# Patient Record
Sex: FEMALE | Race: BLACK OR AFRICAN AMERICAN | NOT HISPANIC OR LATINO
[De-identification: names, ages, dates, MRNs, and addresses within clinical notes are randomized per-mention and may not be internally consistent; named-entity substitution may affect disease eponyms.]

---

## 2021-04-14 PROBLEM — Z00.00 ENCOUNTER FOR PREVENTIVE HEALTH EXAMINATION: Status: ACTIVE | Noted: 2021-04-14

## 2021-04-22 ENCOUNTER — APPOINTMENT (OUTPATIENT)
Dept: NEPHROLOGY | Facility: CLINIC | Age: 81
End: 2021-04-22
Payer: MEDICARE

## 2021-04-22 VITALS
BODY MASS INDEX: 26.34 KG/M2 | WEIGHT: 160 LBS | DIASTOLIC BLOOD PRESSURE: 72 MMHG | SYSTOLIC BLOOD PRESSURE: 140 MMHG | TEMPERATURE: 97.2 F | HEIGHT: 65.5 IN

## 2021-04-22 DIAGNOSIS — E78.5 HYPERLIPIDEMIA, UNSPECIFIED: ICD-10-CM

## 2021-04-22 DIAGNOSIS — N18.4 CHRONIC KIDNEY DISEASE, STAGE 4 (SEVERE): ICD-10-CM

## 2021-04-22 DIAGNOSIS — Z87.39 PERSONAL HISTORY OF OTHER DISEASES OF THE MUSCULOSKELETAL SYSTEM AND CONNECTIVE TISSUE: ICD-10-CM

## 2021-04-22 DIAGNOSIS — E11.21 TYPE 2 DIABETES MELLITUS WITH DIABETIC NEPHROPATHY: ICD-10-CM

## 2021-04-22 DIAGNOSIS — D63.1 CHRONIC KIDNEY DISEASE, UNSPECIFIED: ICD-10-CM

## 2021-04-22 DIAGNOSIS — E55.9 VITAMIN D DEFICIENCY, UNSPECIFIED: ICD-10-CM

## 2021-04-22 DIAGNOSIS — F19.90 OTHER PSYCHOACTIVE SUBSTANCE USE, UNSPECIFIED, UNCOMPLICATED: ICD-10-CM

## 2021-04-22 DIAGNOSIS — E53.8 DEFICIENCY OF OTHER SPECIFIED B GROUP VITAMINS: ICD-10-CM

## 2021-04-22 DIAGNOSIS — C50.912 PERSONAL HISTORY OF MALIGNANT NEOPLASM OF BREAST: ICD-10-CM

## 2021-04-22 DIAGNOSIS — F17.200 NICOTINE DEPENDENCE, UNSPECIFIED, UNCOMPLICATED: ICD-10-CM

## 2021-04-22 DIAGNOSIS — I63.50 CEREBRAL INFARCTION DUE TO UNSPECIFIED OCCLUSION OR STENOSIS OF UNSPECIFIED CEREBRAL ARTERY: ICD-10-CM

## 2021-04-22 DIAGNOSIS — I10 ESSENTIAL (PRIMARY) HYPERTENSION: ICD-10-CM

## 2021-04-22 DIAGNOSIS — Z85.3 PERSONAL HISTORY OF MALIGNANT NEOPLASM OF BREAST: ICD-10-CM

## 2021-04-22 DIAGNOSIS — Z72.89 OTHER PROBLEMS RELATED TO LIFESTYLE: ICD-10-CM

## 2021-04-22 DIAGNOSIS — M17.10 UNILATERAL PRIMARY OSTEOARTHRITIS, UNSPECIFIED KNEE: ICD-10-CM

## 2021-04-22 DIAGNOSIS — N18.9 CHRONIC KIDNEY DISEASE, UNSPECIFIED: ICD-10-CM

## 2021-04-22 PROCEDURE — 99072 ADDL SUPL MATRL&STAF TM PHE: CPT

## 2021-04-22 PROCEDURE — 99205 OFFICE O/P NEW HI 60 MIN: CPT | Mod: 25

## 2021-04-22 PROCEDURE — 99406 BEHAV CHNG SMOKING 3-10 MIN: CPT

## 2021-04-22 NOTE — PHYSICAL EXAM
[Well Developed] : well developed [Well Nourished] : well nourished [Sclera] : the sclera and conjunctiva were normal [PERRL With Normal Accommodation] : pupils were equal in size, round, and reactive to light [Extraocular Movements] : extraocular movements were intact [Outer Ear] : the ears and nose were normal in appearance [Oropharynx] : the oropharynx was normal [Neck Appearance] : the appearance of the neck was normal [Neck Cervical Mass (___cm)] : no neck mass was observed [Jugular Venous Distention Increased] : there was no jugular-venous distention [Thyroid Diffuse Enlargement] : the thyroid was not enlarged [Thyroid Nodule] : there were no palpable thyroid nodules [Normal Rhythm/Effort] : normal respiratory rhythm and effort [Clear Bilaterally] : the lungs were clear to auscultation bilaterally [Normal to Percussion] : the lungs were normal to percussion [Normal Rate] : normal [Normal S1] : normal S1 [Normal S2] : normal S2 [No Murmur] : no murmurs heard [No Pitting Edema] : no pitting edema present [2+] : left 2+ [No Abnormalities] : the abdominal aorta was not enlarged and no bruit was heard [Edema] : there was no peripheral edema [Nonpitting Edema] : nonpitting edema present [___ +] : bilateral [unfilled]+ pretibial pitting edema [Normal] : normal [Soft, Nontender] : the abdomen was soft and nontender [No Mass] : no masses were palpated [No HSM] : no hepatosplenomegaly noted [Cervical Lymph Nodes Enlarged Posterior Bilaterally] : posterior cervical [Cervical Lymph Nodes Enlarged Anterior Bilaterally] : anterior cervical [Supraclavicular Lymph Nodes Enlarged Bilaterally] : supraclavicular [Axillary Lymph Nodes Enlarged Bilaterally] : axillary [Femoral Lymph Nodes Enlarged Bilaterally] : femoral [Inguinal Lymph Nodes Enlarged Bilaterally] : inguinal [No CVA Tenderness] : no ~M costovertebral angle tenderness [No Spinal Tenderness] : no spinal tenderness [Skin Color & Pigmentation] : normal skin color and pigmentation [Skin Turgor] : normal skin turgor [] : no rash [Deep Tendon Reflexes (DTR)] : deep tendon reflexes were 2+ and symmetric [Sensation] : the sensory exam was normal to light touch and pinprick [No Focal Deficits] : no focal deficits [Oriented To Time, Place, And Person] : oriented to person, place, and time [Impaired Insight] : insight and judgment were intact [Affect] : the affect was normal [S3] : no S3 [S4] : no S4 [Right Carotid Bruit] : no bruit heard over the right carotid [Left Carotid Bruit] : no bruit heard over the left carotid [Right Femoral Bruit] : no bruit heard over the right femoral artery [Left Femoral Bruit] : no bruit heard over the left femoral artery [FreeTextEntry1] : Wheelchair bound

## 2021-04-22 NOTE — ASSESSMENT
[FreeTextEntry1] : Pt present today in office accompanied by her son who is her sole caretaker and whom she resides with. Pt is wheelchair bound and incontinent. Pt denies flank pain, dysuria, hematuria, unable to assess alteration in voiding frequency and volume as pt is incontinent and wears diapers.  Pt reports feeling well, denies any complaints, concerns or other issues. Son reports patient has some vaginal discharge for which PCP/GYN referral was made. Pt is in the process of getting set up with doctors here on [place] now that she is a permanent resident.\par \par CKD IV\par Baseline labs drawn in office today\par SCr 3.25 (3/18/21)\par eGFR 15 (3/18/21)\par Renal US done at [facility] which is not available to review\par \par DM\par Controlled \par HgbA1c 4.9 (3/18/21)\par Not on meds\par \par HTN\par Controlled\par Continue current medication regimen\par \par Anemia\par Stable \par On Folate, iron supplements\par Hgb 10.3 (3/18/21)\par Iron and TSAT within range\par \par HLD\par On statin\par \par Vitamin Deficiency\par On MVI, Vitamin D and Vitamin B12 supplements \par \par Baseline labs obtained in office today\par Unable to collect urine today due to incontinence\par Urine cup sent home with patient - son will return to office\par Medications reconciled\par Torsemide 10mg PO daily for BLE edema (non-pitting) sent Rx today\par Will consider repeat Renal US at next visit if needed\par \par RTO in 1 month

## 2021-04-22 NOTE — REVIEW OF SYSTEMS
[Chills] : chills [Lower Ext Edema] : lower extremity edema [Incontinence] : incontinence [Vaginal Discharge] : vaginal discharge [As Noted in HPI] : as noted in HPI [Arthralgias] : arthralgias [Joint Pain] : joint pain [Joint Stiffness] : joint stiffness [Difficulty Walking] : difficulty walking [Negative] : Heme/Lymph [FreeTextEntry5] : Mild non-pitting edema BLE [FreeTextEntry8] : R [de-identified] : Wheelchair bound

## 2021-04-22 NOTE — HISTORY OF PRESENT ILLNESS
[FreeTextEntry1] : Pt presents to office wheelchair bound, accompanied by her son with whom she resides with and is her caretaker.  Pt is a 80 year old female who recently moved to [place] from [place], [place] to live with her son as she was previously in a SNF in [place] and contracted COVID-19 for which son removed her from and brought her home to live with him. Pt has PMH of CKD IV, YANICK on CKD with temporary inpatient HD, CVA, Anemia, HTN, DM2 (controlled), HLD, Multiple vitamin deficiencies and OA. Pt was diagnosed with CKD in SNF in [place] where she was followed by unknown Nephrologist at that location. Patient presents today for initial consult for regular follow-up and management of her advanced CKD.

## 2021-04-23 ENCOUNTER — NON-APPOINTMENT (OUTPATIENT)
Age: 81
End: 2021-04-23

## 2021-04-23 LAB
25(OH)D3 SERPL-MCNC: 31.7 NG/ML
ALBUMIN SERPL ELPH-MCNC: 3.7 G/DL
ANION GAP SERPL CALC-SCNC: 16 MMOL/L
BASOPHILS # BLD AUTO: 0.02 K/UL
BASOPHILS NFR BLD AUTO: 0.2 %
BUN SERPL-MCNC: 91 MG/DL
CALCIUM SERPL-MCNC: 8.7 MG/DL
CALCIUM SERPL-MCNC: 8.7 MG/DL
CHLORIDE SERPL-SCNC: 116 MMOL/L
CO2 SERPL-SCNC: 10 MMOL/L
CREAT SERPL-MCNC: 3.29 MG/DL
EOSINOPHIL # BLD AUTO: 0.22 K/UL
EOSINOPHIL NFR BLD AUTO: 2.7 %
ESTIMATED AVERAGE GLUCOSE: 97 MG/DL
GLUCOSE SERPL-MCNC: 108 MG/DL
HBA1C MFR BLD HPLC: 5 %
HCT VFR BLD CALC: 30.5 %
HGB BLD-MCNC: 9.3 G/DL
IMM GRANULOCYTES NFR BLD AUTO: 0.4 %
LYMPHOCYTES # BLD AUTO: 2.24 K/UL
LYMPHOCYTES NFR BLD AUTO: 27.2 %
MAN DIFF?: NORMAL
MCHC RBC-ENTMCNC: 27 PG
MCHC RBC-ENTMCNC: 30.5 GM/DL
MCV RBC AUTO: 88.7 FL
MONOCYTES # BLD AUTO: 0.51 K/UL
MONOCYTES NFR BLD AUTO: 6.2 %
NEUTROPHILS # BLD AUTO: 5.23 K/UL
NEUTROPHILS NFR BLD AUTO: 63.3 %
PARATHYROID HORMONE INTACT: 101 PG/ML
PHOSPHATE SERPL-MCNC: 6.9 MG/DL
PLATELET # BLD AUTO: 218 K/UL
POTASSIUM SERPL-SCNC: 4.4 MMOL/L
RBC # BLD: 3.44 M/UL
RBC # FLD: 18.6 %
SODIUM SERPL-SCNC: 142 MMOL/L
WBC # FLD AUTO: 8.25 K/UL

## 2021-04-29 ENCOUNTER — APPOINTMENT (OUTPATIENT)
Dept: NEUROLOGY | Facility: CLINIC | Age: 81
End: 2021-04-29
Payer: MEDICARE

## 2021-04-29 VITALS
OXYGEN SATURATION: 95 % | SYSTOLIC BLOOD PRESSURE: 128 MMHG | WEIGHT: 160 LBS | DIASTOLIC BLOOD PRESSURE: 74 MMHG | HEIGHT: 65.5 IN | TEMPERATURE: 98 F | BODY MASS INDEX: 26.34 KG/M2 | HEART RATE: 69 BPM

## 2021-04-29 DIAGNOSIS — Z86.69 PERSONAL HISTORY OF OTHER DISEASES OF THE NERVOUS SYSTEM AND SENSE ORGANS: ICD-10-CM

## 2021-04-29 DIAGNOSIS — Z86.73 PERSONAL HISTORY OF TRANSIENT ISCHEMIC ATTACK (TIA), AND CEREBRAL INFARCTION W/OUT RESIDUAL DEFICITS: ICD-10-CM

## 2021-04-29 DIAGNOSIS — Z86.79 PERSONAL HISTORY OF OTHER DISEASES OF THE CIRCULATORY SYSTEM: ICD-10-CM

## 2021-04-29 DIAGNOSIS — R41.82 ALTERED MENTAL STATUS, UNSPECIFIED: ICD-10-CM

## 2021-04-29 PROCEDURE — 99205 OFFICE O/P NEW HI 60 MIN: CPT

## 2021-04-29 PROCEDURE — 99072 ADDL SUPL MATRL&STAF TM PHE: CPT

## 2021-04-29 RX ORDER — LINACLOTIDE 145 UG/1
145 CAPSULE, GELATIN COATED ORAL DAILY
Refills: 0 | Status: ACTIVE | COMMUNITY
Start: 2021-04-22

## 2021-04-29 RX ORDER — AMLODIPINE BESYLATE 5 MG/1
5 TABLET ORAL
Qty: 90 | Refills: 0 | Status: ACTIVE | COMMUNITY
Start: 2021-04-22

## 2021-04-29 RX ORDER — PANTOPRAZOLE 40 MG/1
40 TABLET, DELAYED RELEASE ORAL AS DIRECTED
Refills: 0 | Status: ACTIVE | COMMUNITY
Start: 2021-04-22

## 2021-04-29 RX ORDER — FOLIC ACID/VIT B COMPLEX AND C 0.8 MG
TABLET ORAL
Qty: 30 | Refills: 1 | Status: ACTIVE | COMMUNITY
Start: 2021-04-22

## 2021-04-29 RX ORDER — CHLORHEXIDINE GLUCONATE 4 %
325 (65 FE) LIQUID (ML) TOPICAL 3 TIMES DAILY
Qty: 270 | Refills: 1 | Status: ACTIVE | COMMUNITY
Start: 2021-04-22

## 2021-04-29 RX ORDER — ATORVASTATIN CALCIUM 20 MG/1
20 TABLET, FILM COATED ORAL
Qty: 90 | Refills: 1 | Status: ACTIVE | COMMUNITY
Start: 2021-04-22

## 2021-04-29 RX ORDER — KRILL/OM-3/DHA/EPA/PHOSPHO/AST 1000-230MG
81 CAPSULE ORAL AT BEDTIME
Refills: 0 | Status: ACTIVE | COMMUNITY
Start: 2021-04-22

## 2021-04-29 RX ORDER — FOLIC ACID 1 MG/1
1 TABLET ORAL
Refills: 1 | Status: ACTIVE | COMMUNITY
Start: 2021-04-22

## 2021-04-29 RX ORDER — DULOXETINE HYDROCHLORIDE 30 MG/1
30 CAPSULE, DELAYED RELEASE PELLETS ORAL DAILY
Refills: 0 | Status: ACTIVE | COMMUNITY
Start: 2021-04-22

## 2021-04-29 RX ORDER — MULTIVIT-MIN/FOLIC/VIT K/LYCOP 400-300MCG
25 MCG TABLET ORAL DAILY
Refills: 0 | Status: ACTIVE | COMMUNITY
Start: 2021-04-22

## 2021-04-29 RX ORDER — METOPROLOL SUCCINATE 25 MG/1
25 TABLET, EXTENDED RELEASE ORAL
Qty: 90 | Refills: 1 | Status: ACTIVE | COMMUNITY
Start: 2021-04-22

## 2021-04-29 NOTE — PHYSICAL EXAM
[General Appearance - Alert] : alert [General Appearance - In No Acute Distress] : in no acute distress [Sclera] : the sclera and conjunctiva were normal [PERRL With Normal Accommodation] : pupils were equal in size, round, reactive to light, with normal accommodation [Outer Ear] : the ears and nose were normal in appearance [Neck Appearance] : the appearance of the neck was normal [Skin Color & Pigmentation] : normal skin color and pigmentation [FreeTextEntry1] : Mental Status: AAO x3, no dysarthria, no aphasia, communicating appropriately\par CN: PERRL, EOMI, VFF, V1-V3 sensation intact, hearing grossly intact, right facial droop, tongue midline\par Motor: \par RUE 3/5\par LLE 4/5\par RLE 4/5\par LUE 5/5\par Bilateral lower extremities edematous\par Reflexes: 1+ throughout, toes equivocal bilaterally\par Coordination: no dysmetria on FTN on left, unable to perform on right due to weakness\par Gait: wheelchair bound\par \par

## 2021-04-29 NOTE — ASSESSMENT
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 She has no known seizure history, however, reason Keppra was started during her hospital admission is unclear.\par \par Recommend continue Keppra 500mg daily for now (this is the dose pt was discharged on )\par 24 hour ambulatory EEG\par Will have staff obtain all records from [facility]. Will decide upon tapering off of Keppra after reviewing records and 24 hr ambulatory EEG.\par \par Can consider EMG/NCS for lower extremity in the future, however, would wait until swelling improves as study will be more yielding without edema\par \par She was advised to notify me for worsening symptoms.\par \par I will see her back after above workup or sooner if necessary

## 2021-04-29 NOTE — HISTORY OF PRESENT ILLNESS
[FreeTextEntry1] : 81 yo woman with medical conditions as outlined below presents with her son for further evaluation. 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 She states she was smoking at the time, but has since quit. She is accompanied to the visit by her son who aided in giving history. He states in August of 2020, she was admitted to [facility] due to renal failure. She had temporary dialysis and required intubation due to altered mental status and decreased responsiveness at that time. She was found to have cellulitis in both legs. He states she was walking with a walker two years ago, but she had slow progression of weakness and is now wheelchair bound. He states pt had blood clots in her legs, but she was taken off of eliquis by her doctors after being on it for sometime. He denies her having memory problems. Patient reports having burning and pain in her feet for years. \par \par Patient's son would like her to come off of Keppra if possible. He states that she was started on it during her hospitalization in [place] in August 2020, but for unclear reason. He states they saw something abnormal on her EEG at that time. Patient's son has a history of convulsive seizures when he was younger, but she denies a personal history of epilepsy. She denies learning difficulties growing up or prior head trauma. She has no further complaints.

## 2021-05-12 ENCOUNTER — APPOINTMENT (OUTPATIENT)
Dept: NEUROLOGY | Facility: CLINIC | Age: 81
End: 2021-05-12
Payer: MEDICARE

## 2021-05-12 PROCEDURE — 95816 EEG AWAKE AND DROWSY: CPT

## 2021-05-12 PROCEDURE — 93040 RHYTHM ECG WITH REPORT: CPT

## 2021-05-13 PROCEDURE — 95719 EEG PHYS/QHP EA INCR W/O VID: CPT

## 2021-05-13 PROCEDURE — 99072 ADDL SUPL MATRL&STAF TM PHE: CPT

## 2021-05-13 PROCEDURE — 95700 EEG CONT REC W/VID EEG TECH: CPT

## 2021-05-13 PROCEDURE — 95708 EEG WO VID EA 12-26HR UNMNTR: CPT

## 2021-05-14 ENCOUNTER — NON-APPOINTMENT (OUTPATIENT)
Age: 81
End: 2021-05-14

## 2021-05-17 ENCOUNTER — NON-APPOINTMENT (OUTPATIENT)
Age: 81
End: 2021-05-17

## 2021-05-17 LAB
APPEARANCE: CLEAR
BACTERIA: NEGATIVE
BILIRUBIN URINE: NEGATIVE
BLOOD URINE: NORMAL
COLOR: NORMAL
CREAT SPEC-SCNC: 34 MG/DL
CREAT/PROT UR: 2 RATIO
GLUCOSE QUALITATIVE U: NEGATIVE
HYALINE CASTS: 3 /LPF
KETONES URINE: NEGATIVE
LEUKOCYTE ESTERASE URINE: ABNORMAL
MICROSCOPIC-UA: NORMAL
NITRITE URINE: NEGATIVE
PH URINE: 7
PROT UR-MCNC: 70 MG/DL
PROTEIN URINE: ABNORMAL
RED BLOOD CELLS URINE: 4 /HPF
SPECIFIC GRAVITY URINE: 1.01
SQUAMOUS EPITHELIAL CELLS: 3 /HPF
URINE COMMENTS: NORMAL
UROBILINOGEN URINE: NORMAL
WHITE BLOOD CELLS URINE: 103 /HPF

## 2021-05-19 ENCOUNTER — APPOINTMENT (OUTPATIENT)
Dept: NEUROLOGY | Facility: CLINIC | Age: 81
End: 2021-05-19
Payer: MEDICARE

## 2021-05-19 ENCOUNTER — RX RENEWAL (OUTPATIENT)
Age: 81
End: 2021-05-19

## 2021-05-19 VITALS
TEMPERATURE: 97.5 F | SYSTOLIC BLOOD PRESSURE: 118 MMHG | OXYGEN SATURATION: 99 % | HEIGHT: 65.5 IN | BODY MASS INDEX: 26.34 KG/M2 | DIASTOLIC BLOOD PRESSURE: 60 MMHG | HEART RATE: 66 BPM | WEIGHT: 160 LBS

## 2021-05-19 PROCEDURE — 99214 OFFICE O/P EST MOD 30 MIN: CPT

## 2021-05-31 NOTE — HISTORY OF PRESENT ILLNESS
[FreeTextEntry1] : Since last visit, patient has had no further seizures. I have reviewed her records from [facility] and as per chart notes, there was no clear reason for starting Keppra. EEG showed triphasic waves which can be seen in metabolic encephalopathy. Patient's son, stopped patient's Keppra 2 weeks ago and she has had no episodes of confusion. She had a 24 hour ambulatory EEG which was found to be normal.

## 2021-05-31 NOTE — ASSESSMENT
[FreeTextEntry1] : 79 yo woman with hx of stroke admitted to [facility] for encephalopathy in the setting of infection now improved\par \par As 24 hr ambulatory EEG unremarkable, it is reasonable to remain off Keppra, however, son and patient were made aware that patient may have a seizure in the future given hx of stroke. They demonstrated understanding\par \par Will try to obtain open MRI brain w/o contrast \par \par F/u with me after MRI\par \par Son and patient were advised to notify me for worsening symptoms.

## 2021-05-31 NOTE — PHYSICAL EXAM
[General Appearance - Alert] : alert [Sclera] : the sclera and conjunctiva were normal [Outer Ear] : the ears and nose were normal in appearance [PERRL With Normal Accommodation] : pupils were equal in size, round, reactive to light, with normal accommodation [Neck Appearance] : the appearance of the neck was normal [Abnormal Walk] : normal gait [Skin Color & Pigmentation] : normal skin color and pigmentation [FreeTextEntry1] : Mental Status: AAO x3, no dysarthria, no aphasia, communicating appropriately\par CN: PERRL, EOMI, VFF, V1-V3 sensation intact, hearing grossly intact, right facial droop, tongue midline\par Motor: \par RUE 3/5\par LLE 4/5\par RLE 4/5\par LUE 5/5\par Bilateral lower extremities edematous\par Reflexes: 1+ throughout, toes equivocal bilaterally\par Coordination: no dysmetria on FTN on left, unable to perform on right due to weakness\par Gait: wheelchair bound

## 2021-06-16 ENCOUNTER — RX RENEWAL (OUTPATIENT)
Age: 81
End: 2021-06-16

## 2021-06-16 RX ORDER — TORSEMIDE 10 MG/1
10 TABLET ORAL
Qty: 30 | Refills: 0 | Status: ACTIVE | COMMUNITY
Start: 2021-04-22 | End: 1900-01-01

## 2021-07-21 ENCOUNTER — RX RENEWAL (OUTPATIENT)
Age: 81
End: 2021-07-21

## 2023-05-14 ENCOUNTER — INPATIENT (INPATIENT)
Facility: HOSPITAL | Age: 83
LOS: 8 days | Discharge: HOME HEALTH | End: 2023-05-23
Attending: HOSPITALIST | Admitting: HOSPITALIST
Payer: MEDICARE

## 2023-05-14 VITALS
TEMPERATURE: 98 F | OXYGEN SATURATION: 99 % | DIASTOLIC BLOOD PRESSURE: 80 MMHG | RESPIRATION RATE: 19 BRPM | SYSTOLIC BLOOD PRESSURE: 133 MMHG | WEIGHT: 160.06 LBS | HEART RATE: 41 BPM

## 2023-05-14 LAB
ALBUMIN SERPL ELPH-MCNC: 2.9 G/DL — LOW (ref 3.3–5)
ALP SERPL-CCNC: 73 U/L — SIGNIFICANT CHANGE UP (ref 40–120)
ALT FLD-CCNC: 14 U/L — SIGNIFICANT CHANGE UP (ref 12–78)
ANION GAP SERPL CALC-SCNC: 13 MMOL/L — SIGNIFICANT CHANGE UP (ref 5–17)
AST SERPL-CCNC: 12 U/L — LOW (ref 15–37)
BASOPHILS # BLD AUTO: 0.02 K/UL — SIGNIFICANT CHANGE UP (ref 0–0.2)
BASOPHILS NFR BLD AUTO: 0.2 % — SIGNIFICANT CHANGE UP (ref 0–2)
BILIRUB SERPL-MCNC: 0.3 MG/DL — SIGNIFICANT CHANGE UP (ref 0.2–1.2)
BLD GP AB SCN SERPL QL: SIGNIFICANT CHANGE UP
BUN SERPL-MCNC: 109 MG/DL — HIGH (ref 7–23)
CALCIUM SERPL-MCNC: 8.2 MG/DL — LOW (ref 8.5–10.1)
CHLORIDE SERPL-SCNC: 104 MMOL/L — SIGNIFICANT CHANGE UP (ref 96–108)
CO2 SERPL-SCNC: 15 MMOL/L — LOW (ref 22–31)
CREAT SERPL-MCNC: 8.93 MG/DL — HIGH (ref 0.5–1.3)
EGFR: 4 ML/MIN/1.73M2 — LOW
EOSINOPHIL # BLD AUTO: 0.11 K/UL — SIGNIFICANT CHANGE UP (ref 0–0.5)
EOSINOPHIL NFR BLD AUTO: 0.9 % — SIGNIFICANT CHANGE UP (ref 0–6)
GAS PNL BLDV: SIGNIFICANT CHANGE UP
GLUCOSE SERPL-MCNC: 96 MG/DL — SIGNIFICANT CHANGE UP (ref 70–99)
HCT VFR BLD CALC: 20.3 % — CRITICAL LOW (ref 34.5–45)
HGB BLD-MCNC: 6.2 G/DL — CRITICAL LOW (ref 11.5–15.5)
IMM GRANULOCYTES NFR BLD AUTO: 0.6 % — SIGNIFICANT CHANGE UP (ref 0–0.9)
LIDOCAIN IGE QN: 110 U/L — SIGNIFICANT CHANGE UP (ref 73–393)
LYMPHOCYTES # BLD AUTO: 2.77 K/UL — SIGNIFICANT CHANGE UP (ref 1–3.3)
LYMPHOCYTES # BLD AUTO: 23.5 % — SIGNIFICANT CHANGE UP (ref 13–44)
MAGNESIUM SERPL-MCNC: 2.6 MG/DL — SIGNIFICANT CHANGE UP (ref 1.6–2.6)
MCHC RBC-ENTMCNC: 24.9 PG — LOW (ref 27–34)
MCHC RBC-ENTMCNC: 30.5 G/DL — LOW (ref 32–36)
MCV RBC AUTO: 81.5 FL — SIGNIFICANT CHANGE UP (ref 80–100)
MONOCYTES # BLD AUTO: 0.49 K/UL — SIGNIFICANT CHANGE UP (ref 0–0.9)
MONOCYTES NFR BLD AUTO: 4.2 % — SIGNIFICANT CHANGE UP (ref 2–14)
NEUTROPHILS # BLD AUTO: 8.32 K/UL — HIGH (ref 1.8–7.4)
NEUTROPHILS NFR BLD AUTO: 70.6 % — SIGNIFICANT CHANGE UP (ref 43–77)
NRBC # BLD: 0 /100 WBCS — SIGNIFICANT CHANGE UP (ref 0–0)
NT-PROBNP SERPL-SCNC: HIGH PG/ML (ref 0–450)
PLATELET # BLD AUTO: 307 K/UL — SIGNIFICANT CHANGE UP (ref 150–400)
POTASSIUM SERPL-MCNC: 4.9 MMOL/L — SIGNIFICANT CHANGE UP (ref 3.5–5.3)
POTASSIUM SERPL-SCNC: 4.9 MMOL/L — SIGNIFICANT CHANGE UP (ref 3.5–5.3)
PROT SERPL-MCNC: 6.9 GM/DL — SIGNIFICANT CHANGE UP (ref 6–8.3)
RAPID RVP RESULT: SIGNIFICANT CHANGE UP
RBC # BLD: 2.49 M/UL — LOW (ref 3.8–5.2)
RBC # FLD: 23.3 % — HIGH (ref 10.3–14.5)
SARS-COV-2 RNA SPEC QL NAA+PROBE: SIGNIFICANT CHANGE UP
SODIUM SERPL-SCNC: 132 MMOL/L — LOW (ref 135–145)
TROPONIN I, HIGH SENSITIVITY RESULT: 40.5 NG/L — SIGNIFICANT CHANGE UP
WBC # BLD: 11.78 K/UL — HIGH (ref 3.8–10.5)
WBC # FLD AUTO: 11.78 K/UL — HIGH (ref 3.8–10.5)

## 2023-05-14 PROCEDURE — 74176 CT ABD & PELVIS W/O CONTRAST: CPT | Mod: 26

## 2023-05-14 PROCEDURE — 93010 ELECTROCARDIOGRAM REPORT: CPT

## 2023-05-14 PROCEDURE — 99285 EMERGENCY DEPT VISIT HI MDM: CPT

## 2023-05-14 PROCEDURE — 71045 X-RAY EXAM CHEST 1 VIEW: CPT | Mod: 26

## 2023-05-14 RX ORDER — SODIUM BICARBONATE 1 MEQ/ML
650 SYRINGE (ML) INTRAVENOUS THREE TIMES A DAY
Refills: 0 | Status: DISCONTINUED | OUTPATIENT
Start: 2023-05-14 | End: 2023-05-15

## 2023-05-14 RX ORDER — PANTOPRAZOLE SODIUM 20 MG/1
40 TABLET, DELAYED RELEASE ORAL ONCE
Refills: 0 | Status: COMPLETED | OUTPATIENT
Start: 2023-05-14 | End: 2023-05-14

## 2023-05-14 RX ORDER — CHLORHEXIDINE GLUCONATE 213 G/1000ML
1 SOLUTION TOPICAL
Refills: 0 | Status: DISCONTINUED | OUTPATIENT
Start: 2023-05-14 | End: 2023-05-23

## 2023-05-14 RX ORDER — FUROSEMIDE 40 MG
80 TABLET ORAL ONCE
Refills: 0 | Status: COMPLETED | OUTPATIENT
Start: 2023-05-14 | End: 2023-05-14

## 2023-05-14 RX ORDER — PANTOPRAZOLE SODIUM 20 MG/1
40 TABLET, DELAYED RELEASE ORAL
Refills: 0 | Status: DISCONTINUED | OUTPATIENT
Start: 2023-05-14 | End: 2023-05-15

## 2023-05-14 RX ORDER — SODIUM CHLORIDE 9 MG/ML
500 INJECTION, SOLUTION INTRAVENOUS ONCE
Refills: 0 | Status: COMPLETED | OUTPATIENT
Start: 2023-05-14 | End: 2023-05-14

## 2023-05-14 RX ORDER — CALCIUM GLUCONATE 100 MG/ML
2 VIAL (ML) INTRAVENOUS ONCE
Refills: 0 | Status: COMPLETED | OUTPATIENT
Start: 2023-05-14 | End: 2023-05-14

## 2023-05-14 RX ORDER — CALCIUM ACETATE 667 MG
667 TABLET ORAL
Refills: 0 | Status: DISCONTINUED | OUTPATIENT
Start: 2023-05-14 | End: 2023-05-23

## 2023-05-14 RX ADMIN — PANTOPRAZOLE SODIUM 40 MILLIGRAM(S): 20 TABLET, DELAYED RELEASE ORAL at 16:35

## 2023-05-14 RX ADMIN — CHLORHEXIDINE GLUCONATE 1 APPLICATION(S): 213 SOLUTION TOPICAL at 22:14

## 2023-05-14 RX ADMIN — Medication 200 GRAM(S): at 16:35

## 2023-05-14 RX ADMIN — SODIUM CHLORIDE 500 MILLILITER(S): 9 INJECTION, SOLUTION INTRAVENOUS at 17:54

## 2023-05-14 RX ADMIN — PANTOPRAZOLE SODIUM 40 MILLIGRAM(S): 20 TABLET, DELAYED RELEASE ORAL at 21:58

## 2023-05-14 RX ADMIN — SODIUM CHLORIDE 500 MILLILITER(S): 9 INJECTION, SOLUTION INTRAVENOUS at 17:24

## 2023-05-14 RX ADMIN — Medication 80 MILLIGRAM(S): at 21:58

## 2023-05-14 NOTE — ED ADULT TRIAGE NOTE - CHIEF COMPLAINT QUOTE
patient  BIBA c/o of SOB , chest pain Hg level 6 , patient has a residual weakens R side and slurred speech from a previous stroke , as per patient this is her base line

## 2023-05-14 NOTE — ED PROVIDER NOTE - OBJECTIVE STATEMENT
82-year-old female with a past medical history of CKD not on dialysis, breast cancer status postresection, history of CVA with right-sided deficits presents to the ED with shortness of breath over the last 3 days.  She denies any orthopnea or any lower extremity swelling.  No fevers or chills.  No cough congestion or upper respiratory symptoms.  She is nonambulatory at baseline coming from nursing facility.  Patient was noted to be bradycardic by EMS and brought back to room 36 for immediate evaluation.

## 2023-05-14 NOTE — H&P ADULT - NSHPPHYSICALEXAM_GEN_ALL_CORE
GENERAL: NAD, lying in bed comfortably  HEAD:  Atraumatic, normocephalic  EYES: EOMI, PERRL, conjunctiva and sclera clear  NECK: Supple, trachea midline, no JVD  HEART: Regular rate and rhythm  LUNGS: Unlabored respirations.  Clear to auscultation bilaterally, no crackles, wheezing, or rhonchi  ABDOMEN: mild LUQ/RUQ abd pain, nondistended no rebound tenderness or guarding   EXTREMITIES: 2+ peripheral pulses bilaterally. 1+ edema bilat   NERVOUS SYSTEM:  A&Ox3, following commands, residual right sided weakness

## 2023-05-14 NOTE — ED PROVIDER NOTE - CLINICAL SUMMARY MEDICAL DECISION MAKING FREE TEXT BOX
82-year-old female with a past medical history of CKD not on dialysis, breast cancer status postresection, history of CVA with right-sided deficits presents to the ED with shortness of breath over the last 3 days. According to son at bedside, was noted to be anemic with a hemoglobin less than 7 on outpatient labs a few days ago.  Also noted by EMS to be taking diltiazem.  Patient endorsing shortness of breath at bedside without any respiratory distress.  Not tachypneic.  Initial EKG as noted below:    40 bpm, bradycardic.  PA interval not seen.   right bundle branch block morphology.  QTc 436 ms within normal limits.  EKG is consistent with a junctional rhythm with a heart rate in the 40s without any acute ST segment changes.  Patient has a right bundle branch block morphology with a QRS complex.    Differential diagnosis includes but not limited to anemia versus hypovolemia versus calcium channel effects.  Will evaluate for ACS.  Will order labs, EKG, meds, imaging, and reassess.  Calcium given in the context of diltiazem use.

## 2023-05-14 NOTE — H&P ADULT - NSHPLABSRESULTS_GEN_ALL_CORE
LABS:                        6.2    11.78 )-----------( 307      ( 14 May 2023 16:35 )             20.3     05-14    132<L>  |  104  |  109<H>  ----------------------------<  96  4.9   |  15<L>  |  8.93<H>    Ca    8.2<L>      14 May 2023 16:35  Phos  8.0     05-14  Mg     2.6     05-14    TPro  6.9  /  Alb  2.9<L>  /  TBili  0.3  /  DBili  x   /  AST  12<L>  /  ALT  14  /  AlkPhos  73  05-14

## 2023-05-14 NOTE — H&P ADULT - NS ATTEND AMEND GEN_ALL_CORE FT
Pt is an 81 yo BF with h/o CKD stage 4 (has had HD) and CVA presented  to the ER 2 to anemia and abd pain.  Pt found to have a Hb 6.2 and was bradycardic but hemodynamically stable. ICU dx: 1) junctional rhythm (bradycardia) 2) Severe anemia, s/p 1 unit PRBC    CVS: Pt no longer sign bradycardic  HEME: Severe anemia in part 2 to CKD; s/p 1 unit PRBC/ ? of giving pt Epogen  FEN: Po renal diet  Renal/Social: Pt states she does not want HD and understands she will eventually die with no HD/ F/u as per Renal  Possible transfer to F

## 2023-05-14 NOTE — PATIENT PROFILE ADULT - FALL HARM RISK - HARM RISK INTERVENTIONS

## 2023-05-14 NOTE — PROVIDER CONTACT NOTE (EICU) - SITUATION
I was called by ICU PA about this  pt currently in ER at 18:55 - in need for ICU care.  Pt is a 82 y F with Hx CKD not on HD ( cr 8.4), CAD on BB, breast ca, CVA with right side deficits who came in with 3 days of SOB and abd pain. In ER  noted bradycardia 40 bpm with junctional rhythm.  She was found to have progressive anemia  H/H 6.2/20.3 today for Hgb 6.7 and 8.0 in last few weeks. No obvious evidence of bleeding

## 2023-05-14 NOTE — ED PROVIDER NOTE - CARE PLAN
1 Principal Discharge DX:	Hypervolemia  Secondary Diagnosis:	Junctional bradycardia  Secondary Diagnosis:	Acute on chronic kidney failure

## 2023-05-14 NOTE — ED PROVIDER NOTE - ATTENDING CONTRIBUTION TO CARE
I have personally seen and examined this pt I have fully participated in the care of this pt I have made amendments to the documentation where appropriate and otherwise hx, exam and plan as documented by Dr. Pizarro. Son is at bed side sts pt has a hx of kidney failure had blood drawn 3 days ago was told pt had hemoglobin 6. Pt has a hx of cva with right side weakness pt now has junctional bradicardia most likely due to elevated potassium calcium gluconate 2 gram ivpb is ordered.

## 2023-05-14 NOTE — ED ADULT TRIAGE NOTE - HISTORY OF COVID-19 VACCINATION
Chief Complaint   Patient presents with    Well Child     9mo       1. Have you been to the ER, urgent care clinic since your last visit? Hospitalized since your last visit? Yes When: 4/24/22 [facility] ER for bronchiolitis, given asthma pump     2. Have you seen or consulted any other health care providers outside of the [address] [place] [address] since your last visit? Include any pap smears or colon screening. No    Pt is here with mother today for [facility] ER. Mom is concerned with rash on lower back.      Visit Vitals  Temp 97.5 °F (36.4 °C) (Temporal)   Resp 22   Ht (!) 2' 3.56\" (0.7 m)   Wt 17 lb 12.3 oz (8.06 kg)   BMI 16.45 kg/m² Yes

## 2023-05-14 NOTE — H&P ADULT - ASSESSMENT
81yo F w/hx CKD stage 4 (Crn 8.42), CVA presents to the ED with anemia and abd pain and found to be bradycardic but HD stable with junctional rhythm likely 2/2 medication induced with progressive kidney disease and anemia likely of chronic disease. Admitted to ICU for HD monitoring.     # Neuro:  -A/Ox3  no active issues    #Resp:  -satting adequately on RA, maintain sats>92%   -cxr wnl  - incentive spirometry   -RVP neg     #CV:  // junctional selene  -likely 2/2 medication induced with progressive CKD  -hold all AV veronica blocking agents  -trops neg and no ischemic ekg changes. daily EKG and cards consult in AM  -zolls at bedside  -HD stable without vasopressor requirements  - MAP goal>65  -will diurese with prbc infusion     #GI:  //abd pain 2/2 uremia vs intrabd pathology?  -no evidence of GI bleed  -Diet: advance as tolerated  -f/u CT abd non con   - Bowel regimen    #Renal:  // acute on chronic CKD stage 4 with non AG  metabolic acidosis   -Crn 8.42-->8.93 but makes urine  - on diuretics at home. clinically hypervolemic. will diurese with prbc   - krys, cont to monitor strict I/Os  - metabolic acidosis likely 2/2 ARF  -renal consulted in ED, will f/u recs     #ID:  - afebrile, wbc nl  - cont to monitor off abx     #Heme:  //anemia likely of chronic disease from CKD   - Hb 6.7-->6.2  -transfuse for Hb target >7 and f/u repeat cbc   -HSQ once cbc stable     #Endo:  - maintain goal glucose<180 on bmp    case and plan discussed with eICU attending

## 2023-05-14 NOTE — ED ADULT NURSE REASSESSMENT NOTE - NS ED NURSE REASSESS COMMENT FT1
patient bradycardic, dr centeno made aware, placed on pacing pads. blood consent signed with son for blood. patient agrees at this time.

## 2023-05-14 NOTE — PROGRESS NOTE ADULT - SUBJECTIVE AND OBJECTIVE BOX
Patient is a 82y old  Female who presents with a chief complaint of bradycardia and anemia (14 May 2023 19:16)      BRIEF HOSPITAL COURSE:   2-year-old F w/PMH CKD stage 4 (baseline Crn 8.26)  not on dialysis (but has had urgent HD in the past), breast cancer status postresection, history of CVA with right-sided deficits had lab work done at outpt office with Hb of 6.7 and was told to go to ED. Pt also endorses dry cough abd pain for last few days.  She denies any orthopnea or any lower extremity swelling.  No fevers or chills.  She is nonambulatory at baseline but still urinates. Notably, patient is on a metoprolol at home. In the ED, pt found to be bradycardic to 30s with junctional rhythm on EKG. Pt A/Ox3 and asx with bradycardia and BP in 140s. Trops negative and EKG without ischemic changes. CBC with Hb of 6.2. Bicarb 15 and BUN/Crn 109/8.93.   ICU consulted for bradycardia.     Admitted for such       Events last 24 hours:   wrote for 80mg lasix to be received after blood transfusion 2/2 SOB, effusions seen on CT      PAST MEDICAL & SURGICAL HISTORY:  Stage 5 chronic kidney disease not on chronic dialysis      Anemia      No significant past surgical history        Allergies    No Known Allergies    Intolerances      FAMILY HISTORY:  No pertinent family history in first degree relatives    social hx:  Denies    Review of Systems:  CONSTITUTIONAL: No fever, chills, or fatigue  EYES: No eye pain, visual disturbances, or discharge  ENMT:  No difficulty hearing, tinnitus, vertigo; No sinus or throat pain  NECK: No pain or stiffness  RESPIRATORY: No cough, wheezing, chills or hemoptysis; No shortness of breath  CARDIOVASCULAR: No chest pain, palpitations, dizziness, or leg swelling  GASTROINTESTINAL: No abdominal or epigastric pain. No nausea, vomiting, or hematemesis; No diarrhea or constipation. No melena or hematochezia.  GENITOURINARY: No dysuria, frequency, hematuria, or incontinence  NEUROLOGICAL: No headaches, memory loss, loss of strength, numbness, or tremors  SKIN: No itching, burning, rashes, or lesions   MUSCULOSKELETAL: No joint pain or swelling; No muscle, back, or extremity pain  PSYCHIATRIC: No depression, anxiety, mood swings, or difficulty sleeping      Medications:    furosemide   Injectable 80 milliGRAM(s) IV Push once                      chlorhexidine 2% Cloths 1 Application(s) Topical <User Schedule>            ICU Vital Signs Last 24 Hrs  T(C): 36.6 (14 May 2023 18:20), Max: 36.7 (14 May 2023 15:39)  T(F): 97.8 (14 May 2023 18:20), Max: 98 (14 May 2023 15:39)  HR: 37 (14 May 2023 18:20) (36 - 41)  BP: 129/56 (14 May 2023 18:40) (129/56 - 143/80)  BP(mean): --  ABP: --  ABP(mean): --  RR: 22 (14 May 2023 18:20) (16 - 22)  SpO2: 96% (14 May 2023 18:40) (96% - 99%)    O2 Parameters below as of 14 May 2023 18:40  Patient On (Oxygen Delivery Method): room air          Vital Signs Last 24 Hrs  T(C): 36.6 (14 May 2023 18:20), Max: 36.7 (14 May 2023 15:39)  T(F): 97.8 (14 May 2023 18:20), Max: 98 (14 May 2023 15:39)  HR: 37 (14 May 2023 18:20) (36 - 41)  BP: 129/56 (14 May 2023 18:40) (129/56 - 143/80)  BP(mean): --  RR: 22 (14 May 2023 18:20) (16 - 22)  SpO2: 96% (14 May 2023 18:40) (96% - 99%)    Parameters below as of 14 May 2023 18:40  Patient On (Oxygen Delivery Method): room air            I&O's Detail        LABS:                        6.2    11.78 )-----------( 307      ( 14 May 2023 16:35 )             20.3     05-14    132<L>  |  104  |  109<H>  ----------------------------<  96  4.9   |  15<L>  |  8.93<H>    Ca    8.2<L>      14 May 2023 16:35  Phos  8.0     05-14  Mg     2.6     05-14    TPro  6.9  /  Alb  2.9<L>  /  TBili  0.3  /  DBili  x   /  AST  12<L>  /  ALT  14  /  AlkPhos  73  05-14          CAPILLARY BLOOD GLUCOSE            CULTURES:      Physical Examination:    General: Elderly female in bed, NAD    HEENT: Pupils equal, reactive to light.  Symmetric.    PULM: decreased breath sounds at both lung bases    CVS: +s1/s2    ABD: Soft    EXT: Mild LE edema     SKIN: Warm    Neuro: awake and alert     RADIOLOGY:   < from: CT Abdomen and Pelvis No Cont (05.14.23 @ 19:57) >    INTERPRETATION:  CLINICAL INFORMATION: Abdominal pain, end-stage renal   disease, not on dialysis, history of cystic kidney disease, breast   cancer, history of stroke    COMPARISON: None.    CONTRAST/COMPLICATIONS:  IV Contrast: None  Oral Contrast: None  Complications: None reported    PROCEDURE:  CT of the Abdomen and Pelvis was performed.  Sagittal and coronal reformats were performed.    FINDINGS:  LOWER CHEST: Small bilateral effusions, mitral valve calcifications and   coronary artery calcifications. Incidentally noted lipoma in the left rib   cage musculature    LIVER: Within normal limits.  BILE DUCTS: Normal caliber.  GALLBLADDER: Cholecystectomy.  SPLEEN: Within normal limits.  PANCREAS: Atrophic  ADRENALS: Within normal limits.  KIDNEYS/URETERS: Left kidney is atrophic and contains a calcified cyst.   Right kidney is also atrophic and contains a simple appearing cyst.    BLADDER: Within normal limits.  REPRODUCTIVE ORGANS: Calcified fibroid uterus    BOWEL: No bowel obstruction is seen. A moderate amount of stool is seen   in the rectal vault. Oral contrast is seen retained in numerous colonic   diverticula without evidence of diverticulitis. Appendix is not   visualized. No evidence of inflammation in the pericecal region.  PERITONEUM: No ascites.  VESSELS: IVC filter seen in place. Atherosclerotic calcifications of the   aorta  RETROPERITONEUM/LYMPH NODES: No lymphadenopathy.  ABDOMINAL WALL: Mild edema in the subcutaneous soft tissues  BONES: Within normal limits.    IMPRESSION:  Moderate amount of stool in the rectal vault. No evidence of   inflammation. Small bilateral effusions. Other findings as above.

## 2023-05-14 NOTE — CONSULT NOTE ADULT - SUBJECTIVE AND OBJECTIVE BOX
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 C/o interm cough, + SOB. No fevers or chills. No changes in UO. In the ED pt found to be bradycardic to 30s. Noted to have Hgb 6.2, K 4.9.    ROS:  as above    Allergies:  No Known Allergies    PAST MEDICAL & SURGICAL HISTORY: as above  CKD 5  Anemia    SOCIAL HISTORY:  negative    FAMILY HISTORY:  No pertinent family history in first degree relatives    MEDICATIONS  (STANDING):  chlorhexidine 2% Cloths 1 Application(s) Topical <User Schedule>  pantoprazole  Injectable 40 milliGRAM(s) IV Push two times a day    Vital Signs Last 24 Hrs  T(C): 36.5 (05-14-23 @ 20:30), Max: 36.7 (05-14-23 @ 15:39)  T(F): 97.7 (05-14-23 @ 20:30), Max: 98 (05-14-23 @ 15:39)  HR: 33 (05-14-23 @ 22:00) (33 - 64)  BP: 140/60 (05-14-23 @ 22:00) (129/56 - 144/92)  BP(mean): 84 (05-14-23 @ 22:00) (84 - 109)  RR: 13 (05-14-23 @ 22:00) (13 - 27)  SpO2: 98% (05-14-23 @ 22:00) (96% - 99%)    s1s2  b/l air entry  soft, ND  no edema    LABS:                        6.2    11.78 )-----------( 307      ( 14 May 2023 16:35 )             20.3     05-14    132<L>  |  104  |  109<H>  ----------------------------<  96  4.9   |  15<L>  |  8.93<H>    Ca    8.2<L>      14 May 2023 16:35  Phos  8.0     05-14  Mg     2.6     05-14    TPro  6.9  /  Alb  2.9<L>  /  TBili  0.3  /  DBili  x   /  AST  12<L>  /  ALT  14  /  AlkPhos  73  05-14    LIVER FUNCTIONS - ( 14 May 2023 16:35 )  Alb: 2.9 g/dL / Pro: 6.9 gm/dL / ALK PHOS: 73 U/L / ALT: 14 U/L / AST: 12 U/L / GGT: x           A/P:    Known CKD 5 near baseline (BUN/Cr - 101/8.26 on 2/14/23)  SOB iso severe anemia and bradycardia  CT A/P w/sm b/l pl eff, no hydro  Check UA  Observe in ICU  Cardiology eval   Bld tx as ordered  No objection to IV diuresis as needed  F/u CBC, BMP, UO  Na Bicarb for met acidosis  Phoslo for hyperphosphatemia  May need RRT in next 24-48hrs    [phone]               NEPHROLOGY CONSULTATION    CHIEF COMPLAINT: low Hgb    HPI:  Pt is 81 yo F w/PMH CKD 5 not yet on dialysis, breast cancer, s/p resection, history of CVA with R deficits sent to ED due to low Hgb on op bld work. C/o interm cough, SOB. No fevers or chills. No changes in UO. In the ED pt found to be bradycardic to 30s. Noted to have Hgb 6.2.    ROS:  as above    Allergies:  No Known Allergies    PAST MEDICAL & SURGICAL HISTORY: as above  CKD 5  Anemia    SOCIAL HISTORY:  negative    FAMILY HISTORY:  No pertinent family history in first degree relatives    MEDICATIONS  (STANDING):  chlorhexidine 2% Cloths 1 Application(s) Topical <User Schedule>  pantoprazole  Injectable 40 milliGRAM(s) IV Push two times a day    Vital Signs Last 24 Hrs  T(C): 36.5 (05-14-23 @ 20:30), Max: 36.7 (05-14-23 @ 15:39)  T(F): 97.7 (05-14-23 @ 20:30), Max: 98 (05-14-23 @ 15:39)  HR: 33 (05-14-23 @ 22:00) (33 - 64)  BP: 140/60 (05-14-23 @ 22:00) (129/56 - 144/92)  BP(mean): 84 (05-14-23 @ 22:00) (84 - 109)  RR: 13 (05-14-23 @ 22:00) (13 - 27)  SpO2: 98% (05-14-23 @ 22:00) (96% - 99%)    s1s2  b/l air entry  soft, ND  no edema    LABS:                        6.2    11.78 )-----------( 307      ( 14 May 2023 16:35 )             20.3     05-14    132<L>  |  104  |  109<H>  ----------------------------<  96  4.9   |  15<L>  |  8.93<H>    Ca    8.2<L>      14 May 2023 16:35  Phos  8.0     05-14  Mg     2.6     05-14    TPro  6.9  /  Alb  2.9<L>  /  TBili  0.3  /  DBili  x   /  AST  12<L>  /  ALT  14  /  AlkPhos  73  05-14    LIVER FUNCTIONS - ( 14 May 2023 16:35 )  Alb: 2.9 g/dL / Pro: 6.9 gm/dL / ALK PHOS: 73 U/L / ALT: 14 U/L / AST: 12 U/L / GGT: x           A/P:    Known CKD 5 near baseline (BUN/Cr - 101/8.26 on 2/14/23)  SOB iso severe anemia and bradycardia  CT A/P w/sm b/l pl eff, no hydro  Check UA  Observe in ICU  Cardiology eval   Bld tx as ordered  No objection to IV diuresis as needed  F/u CBC, BMP, UO  Na Bicarb for met acidosis  Phoslo for hyperphosphatemia  May need RRT in next 24-48hrs  Will follow closely    [phone]               NEPHROLOGY CONSULTATION    CHIEF COMPLAINT: low Hgb    HPI:  Pt is 83 yo F w/PMH CKD 5 not yet on dialysis, breast cancer, s/p resection, history of CVA with R deficits sent to ED due to low Hgb on op bld work. C/o interm cough, SOB. No fevers or chills. No changes in UO. In the ED pt found to be bradycardic to 30s. Noted to have Hgb 6.2.    ROS:  as above    Allergies:  No Known Allergies    PAST MEDICAL & SURGICAL HISTORY: as above  CKD 5  Anemia    SOCIAL HISTORY:  negative    FAMILY HISTORY:  No pertinent family history in first degree relatives    MEDICATIONS  (STANDING):  chlorhexidine 2% Cloths 1 Application(s) Topical <User Schedule>  pantoprazole  Injectable 40 milliGRAM(s) IV Push two times a day    Vital Signs Last 24 Hrs  T(C): 36.5 (05-14-23 @ 20:30), Max: 36.7 (05-14-23 @ 15:39)  T(F): 97.7 (05-14-23 @ 20:30), Max: 98 (05-14-23 @ 15:39)  HR: 33 (05-14-23 @ 22:00) (33 - 64)  BP: 140/60 (05-14-23 @ 22:00) (129/56 - 144/92)  BP(mean): 84 (05-14-23 @ 22:00) (84 - 109)  RR: 13 (05-14-23 @ 22:00) (13 - 27)  SpO2: 98% (05-14-23 @ 22:00) (96% - 99%)    s1s2  b/l air entry  soft, ND  no edema    LABS:                        6.2    11.78 )-----------( 307      ( 14 May 2023 16:35 )             20.3     05-14    132<L>  |  104  |  109<H>  ----------------------------<  96  4.9   |  15<L>  |  8.93<H>    Ca    8.2<L>      14 May 2023 16:35  Phos  8.0     05-14  Mg     2.6     05-14    TPro  6.9  /  Alb  2.9<L>  /  TBili  0.3  /  DBili  x   /  AST  12<L>  /  ALT  14  /  AlkPhos  73  05-14    LIVER FUNCTIONS - ( 14 May 2023 16:35 )  Alb: 2.9 g/dL / Pro: 6.9 gm/dL / ALK PHOS: 73 U/L / ALT: 14 U/L / AST: 12 U/L / GGT: x           A/P:    Known CKD 5 near baseline (BUN/Cr - 101/8.26 on 2/14/23)  SOB iso severe anemia and bradycardia  CT A/P w/sm b/l pl eff, no hydro  Check UA  Observe in ICU  Echo  Cardiology eval   Bld tx as ordered  No objection to IV diuresis as needed  F/u CBC, BMP, UO  Na Bicarb for met acidosis  Phoslo for hyperphosphatemia  May need RRT in next 24-48hrs  Will follow closely    [phone]

## 2023-05-14 NOTE — ED ADULT NURSE REASSESSMENT NOTE - NS ED NURSE REASSESS COMMENT FT1
Pt AOX3 and received from Day RN Roxanne.  Pt reports no acute distress at this time. Pt placed on zoll and HR in the jessica 30s told this is her norm. Pt awaiting to go to ICU.

## 2023-05-14 NOTE — PROGRESS NOTE ADULT - ASSESSMENT
83yo F w/hx CKD stage 4 (Crn 8.42), CVA presents to the ED with anemia and abd pain and found to be bradycardic but HD stable with junctional rhythm likely 2/2 medication induced with progressive kidney disease and anemia likely of chronic disease. Admitted to ICU for HD monitoring.       1. ABLA, suspect anemia of chronic dz r/o GI bleed   2. ARF on ESRD  3. Acidosis     Plan  Neuro: no issues   Cardio: junctional bradycardia suspeected 2/2 BB use. currently HD stable. Was given IV calcium in attempt to reverse but no affect. zoll at bedside. EKG and cardiology in morning   Pulm: stable on room air. does have b/l effusions seen on CT. Lasix after blood products or sooner if symptomatic   GI: CT abd noted. start protonix BID. NPO. check occult stool. no s/s of GI bleed currently. suspect anemia of chronic dz  Renal: strict i/o, renally dose meds prn. lasix to be given post blood products given hypervolemic status. high risk for needing HD and it may be required tomorrow . renal consult  . place marcano   ID: no infectious concerns   Heme: SCDs only. check cbc 1 hour after blood products , target hbg above 7  Endo: no issues     dispo: Remains in ICU, pt is full code

## 2023-05-14 NOTE — ED PROVIDER NOTE - PROGRESS NOTE DETAILS
Pt has junctional selene 35 to 40  bp 133/64 r 15 and pox 97% in room air and pt denies headache, dizziness, sob, chest pain, nausea, vomiting, abd pain. ICU PA is notified ICU PA eval pt and sts pt is accepts to ICU under Dr. Shah. Nephro consulted

## 2023-05-14 NOTE — PATIENT PROFILE ADULT - LIFE CHALLENGES - DETAILS
Patient needs to be [facility] Will need assistance sending paperwork over to them

## 2023-05-14 NOTE — ED PROVIDER NOTE - PHYSICAL EXAMINATION
GENERAL: no acute distress  HEAD: normocephalic, atraumatic  HEENT: pale conjunctiva, oral mucosa moist, neck supple  CARDIAC: bradycardiac  PULM: clear to ascultation bilaterally, no crackles, rales, rhonchi, or wheezing  GI: abdomen nondistended, soft, nontender, no guarding or rebound tenderness  : no CVA tenderness, no suprapubic tenderness  NEURO: alert and oriented x 3, normal speech  MSK: no visible deformities, R sided weakness  SKIN: no visible rashes, dry, well-perfused  PSYCH: appropriate mood and affect

## 2023-05-14 NOTE — ED PROVIDER NOTE - NS ED ATTENDING STATEMENT MOD
I have seen and examined this patient and fully participated in the care of this patient as the teaching attending.  The service was shared with the URIAH.  I reviewed and verified the documentation and independently performed the documented:

## 2023-05-14 NOTE — ED ADULT NURSE REASSESSMENT NOTE - NS ED NURSE REASSESS COMMENT FT1
blood transfusion initiated as ordered, no sign of blood transfusion at this time. patient denies any sob, bleeding, chills

## 2023-05-14 NOTE — PROVIDER CONTACT NOTE (EICU) - RECOMMENDATIONS
ICU management, telemetry monitoring, external pacing pads ,cardiology consult  PRBC transfusion  abd CT  renal consult

## 2023-05-14 NOTE — H&P ADULT - HISTORY OF PRESENT ILLNESS
82-year-old F w/PMH CKD stage 4 (baseline Crn 8.26)  not on dialysis (but has had urgent HD in the past), breast cancer status postresection, history of CVA with right-sided deficits had lab work done at outpt office with Hb of 6.7 and was told to go to ED. Pt also endorses dry cough abd pain for last few days.  She denies any orthopnea or any lower extremity swelling.  No fevers or chills.  She is nonambulatory at baseline but still urinates. Notably, patient is on a metoprolol at home. In the ED, pt found to be bradycardic to 30s with junctional rhythm on EKG. Pt A/Ox3 and asx with bradycardia and BP in 140s. Trops negative and EKG without ischemic changes. CBC with Hb of 6.2. Bicarb 15 and BUN/Crn 109/8.93.   ICU consulted for bradycardia.     Subjective: Pt seen and examined at the bedside. Pt A/Ox4, satting well on RA, with BP in 140s and selene to 30s. Pt denies CP, SOB, dizziness, palpitations, or lightheadedness. Pt endorses persistent abd pain and cough but denies n/v/SOB. Denies any hematemesis, melena, or any other signs of bleeding

## 2023-05-15 LAB
ALBUMIN SERPL ELPH-MCNC: 2.7 G/DL — LOW (ref 3.3–5)
ALP SERPL-CCNC: 70 U/L — SIGNIFICANT CHANGE UP (ref 40–120)
ALT FLD-CCNC: 12 U/L — SIGNIFICANT CHANGE UP (ref 12–78)
ANION GAP SERPL CALC-SCNC: 11 MMOL/L — SIGNIFICANT CHANGE UP (ref 5–17)
ANION GAP SERPL CALC-SCNC: 12 MMOL/L — SIGNIFICANT CHANGE UP (ref 5–17)
APPEARANCE UR: CLEAR — SIGNIFICANT CHANGE UP
APTT BLD: 32.3 SEC — SIGNIFICANT CHANGE UP (ref 27.5–35.5)
AST SERPL-CCNC: 11 U/L — LOW (ref 15–37)
BACTERIA # UR AUTO: ABNORMAL
BILIRUB SERPL-MCNC: 0.3 MG/DL — SIGNIFICANT CHANGE UP (ref 0.2–1.2)
BILIRUB UR-MCNC: NEGATIVE — SIGNIFICANT CHANGE UP
BUN SERPL-MCNC: 111 MG/DL — HIGH (ref 7–23)
BUN SERPL-MCNC: 115 MG/DL — HIGH (ref 7–23)
CALCIUM SERPL-MCNC: 8.3 MG/DL — LOW (ref 8.5–10.1)
CALCIUM SERPL-MCNC: 8.4 MG/DL — LOW (ref 8.5–10.1)
CHLORIDE SERPL-SCNC: 107 MMOL/L — SIGNIFICANT CHANGE UP (ref 96–108)
CHLORIDE SERPL-SCNC: 108 MMOL/L — SIGNIFICANT CHANGE UP (ref 96–108)
CO2 SERPL-SCNC: 14 MMOL/L — LOW (ref 22–31)
CO2 SERPL-SCNC: 16 MMOL/L — LOW (ref 22–31)
COLOR SPEC: YELLOW — SIGNIFICANT CHANGE UP
CREAT SERPL-MCNC: 8.87 MG/DL — HIGH (ref 0.5–1.3)
CREAT SERPL-MCNC: 9.03 MG/DL — HIGH (ref 0.5–1.3)
DIFF PNL FLD: ABNORMAL
EGFR: 4 ML/MIN/1.73M2 — LOW
EGFR: 4 ML/MIN/1.73M2 — LOW
EPI CELLS # UR: SIGNIFICANT CHANGE UP
FERRITIN SERPL-MCNC: 507 NG/ML — HIGH (ref 15–150)
FOLATE SERPL-MCNC: >20 NG/ML — SIGNIFICANT CHANGE UP
GLUCOSE SERPL-MCNC: 106 MG/DL — HIGH (ref 70–99)
GLUCOSE SERPL-MCNC: 78 MG/DL — SIGNIFICANT CHANGE UP (ref 70–99)
GLUCOSE UR QL: NEGATIVE MG/DL — SIGNIFICANT CHANGE UP
HCT VFR BLD CALC: 22.1 % — LOW (ref 34.5–45)
HCT VFR BLD CALC: 24 % — LOW (ref 34.5–45)
HGB BLD-MCNC: 7.1 G/DL — LOW (ref 11.5–15.5)
HGB BLD-MCNC: 7.4 G/DL — LOW (ref 11.5–15.5)
INR BLD: 0.96 RATIO — SIGNIFICANT CHANGE UP (ref 0.88–1.16)
IRON SATN MFR SERPL: 39 % — SIGNIFICANT CHANGE UP (ref 14–50)
IRON SATN MFR SERPL: 89 UG/DL — SIGNIFICANT CHANGE UP (ref 30–160)
KETONES UR-MCNC: NEGATIVE — SIGNIFICANT CHANGE UP
LEUKOCYTE ESTERASE UR-ACNC: ABNORMAL
MAGNESIUM SERPL-MCNC: 2.5 MG/DL — SIGNIFICANT CHANGE UP (ref 1.6–2.6)
MAGNESIUM SERPL-MCNC: 2.5 MG/DL — SIGNIFICANT CHANGE UP (ref 1.6–2.6)
MCHC RBC-ENTMCNC: 26.4 PG — LOW (ref 27–34)
MCHC RBC-ENTMCNC: 26.5 PG — LOW (ref 27–34)
MCHC RBC-ENTMCNC: 30.8 G/DL — LOW (ref 32–36)
MCHC RBC-ENTMCNC: 32.1 G/DL — SIGNIFICANT CHANGE UP (ref 32–36)
MCV RBC AUTO: 82.5 FL — SIGNIFICANT CHANGE UP (ref 80–100)
MCV RBC AUTO: 85.7 FL — SIGNIFICANT CHANGE UP (ref 80–100)
MRSA PCR RESULT.: DETECTED
NITRITE UR-MCNC: NEGATIVE — SIGNIFICANT CHANGE UP
NRBC # BLD: 0 /100 WBCS — SIGNIFICANT CHANGE UP (ref 0–0)
NRBC # BLD: 0 /100 WBCS — SIGNIFICANT CHANGE UP (ref 0–0)
NT-PROBNP SERPL-SCNC: HIGH PG/ML (ref 0–450)
PH UR: 6.5 — SIGNIFICANT CHANGE UP (ref 5–8)
PHOSPHATE SERPL-MCNC: 7.9 MG/DL — HIGH (ref 2.5–4.5)
PHOSPHATE SERPL-MCNC: 8.3 MG/DL — HIGH (ref 2.5–4.5)
PLATELET # BLD AUTO: 291 K/UL — SIGNIFICANT CHANGE UP (ref 150–400)
PLATELET # BLD AUTO: 294 K/UL — SIGNIFICANT CHANGE UP (ref 150–400)
POTASSIUM SERPL-MCNC: 5 MMOL/L — SIGNIFICANT CHANGE UP (ref 3.5–5.3)
POTASSIUM SERPL-MCNC: 5 MMOL/L — SIGNIFICANT CHANGE UP (ref 3.5–5.3)
POTASSIUM SERPL-SCNC: 5 MMOL/L — SIGNIFICANT CHANGE UP (ref 3.5–5.3)
POTASSIUM SERPL-SCNC: 5 MMOL/L — SIGNIFICANT CHANGE UP (ref 3.5–5.3)
PROT SERPL-MCNC: 5.7 G/DL — LOW (ref 6–8.3)
PROT SERPL-MCNC: 5.7 G/DL — LOW (ref 6–8.3)
PROT SERPL-MCNC: 6.2 GM/DL — SIGNIFICANT CHANGE UP (ref 6–8.3)
PROT UR-MCNC: 500 MG/DL
PROTHROM AB SERPL-ACNC: 11.4 SEC — SIGNIFICANT CHANGE UP (ref 10.5–13.4)
RBC # BLD: 2.68 M/UL — LOW (ref 3.8–5.2)
RBC # BLD: 2.8 M/UL — LOW (ref 3.8–5.2)
RBC # FLD: 22.5 % — HIGH (ref 10.3–14.5)
RBC # FLD: 22.5 % — HIGH (ref 10.3–14.5)
RBC CASTS # UR COMP ASSIST: SIGNIFICANT CHANGE UP /HPF (ref 0–4)
S AUREUS DNA NOSE QL NAA+PROBE: DETECTED
SODIUM SERPL-SCNC: 133 MMOL/L — LOW (ref 135–145)
SODIUM SERPL-SCNC: 135 MMOL/L — SIGNIFICANT CHANGE UP (ref 135–145)
SP GR SPEC: 1.01 — SIGNIFICANT CHANGE UP (ref 1.01–1.02)
TIBC SERPL-MCNC: 230 UG/DL — SIGNIFICANT CHANGE UP (ref 220–430)
TROPONIN I, HIGH SENSITIVITY RESULT: 44.7 NG/L — SIGNIFICANT CHANGE UP
TSH SERPL-MCNC: 2.59 UIU/ML — SIGNIFICANT CHANGE UP (ref 0.36–3.74)
UIBC SERPL-MCNC: 142 UG/DL — SIGNIFICANT CHANGE UP (ref 110–370)
UROBILINOGEN FLD QL: NEGATIVE MG/DL — SIGNIFICANT CHANGE UP
VIT B12 SERPL-MCNC: >2000 PG/ML — HIGH (ref 232–1245)
WBC # BLD: 11.16 K/UL — HIGH (ref 3.8–10.5)
WBC # BLD: 11.34 K/UL — HIGH (ref 3.8–10.5)
WBC # FLD AUTO: 11.16 K/UL — HIGH (ref 3.8–10.5)
WBC # FLD AUTO: 11.34 K/UL — HIGH (ref 3.8–10.5)
WBC UR QL: ABNORMAL

## 2023-05-15 PROCEDURE — 93010 ELECTROCARDIOGRAM REPORT: CPT

## 2023-05-15 PROCEDURE — 99223 1ST HOSP IP/OBS HIGH 75: CPT

## 2023-05-15 PROCEDURE — 93306 TTE W/DOPPLER COMPLETE: CPT | Mod: 26

## 2023-05-15 RX ORDER — SODIUM BICARBONATE 1 MEQ/ML
1300 SYRINGE (ML) INTRAVENOUS THREE TIMES A DAY
Refills: 0 | Status: DISCONTINUED | OUTPATIENT
Start: 2023-05-15 | End: 2023-05-23

## 2023-05-15 RX ORDER — POLYETHYLENE GLYCOL 3350 17 G/17G
17 POWDER, FOR SOLUTION ORAL DAILY
Refills: 0 | Status: DISCONTINUED | OUTPATIENT
Start: 2023-05-15 | End: 2023-05-23

## 2023-05-15 RX ORDER — PANTOPRAZOLE SODIUM 20 MG/1
40 TABLET, DELAYED RELEASE ORAL EVERY 12 HOURS
Refills: 0 | Status: DISCONTINUED | OUTPATIENT
Start: 2023-05-15 | End: 2023-05-18

## 2023-05-15 RX ORDER — FERROUS SULFATE 325(65) MG
325 TABLET ORAL DAILY
Refills: 0 | Status: DISCONTINUED | OUTPATIENT
Start: 2023-05-15 | End: 2023-05-23

## 2023-05-15 RX ORDER — FOLIC ACID 0.8 MG
1 TABLET ORAL DAILY
Refills: 0 | Status: DISCONTINUED | OUTPATIENT
Start: 2023-05-15 | End: 2023-05-23

## 2023-05-15 RX ORDER — DULOXETINE HYDROCHLORIDE 30 MG/1
30 CAPSULE, DELAYED RELEASE ORAL DAILY
Refills: 0 | Status: DISCONTINUED | OUTPATIENT
Start: 2023-05-15 | End: 2023-05-23

## 2023-05-15 RX ORDER — FUROSEMIDE 40 MG
40 TABLET ORAL ONCE
Refills: 0 | Status: COMPLETED | OUTPATIENT
Start: 2023-05-15 | End: 2023-05-15

## 2023-05-15 RX ORDER — SODIUM ZIRCONIUM CYCLOSILICATE 10 G/10G
10 POWDER, FOR SUSPENSION ORAL EVERY 8 HOURS
Refills: 0 | Status: COMPLETED | OUTPATIENT
Start: 2023-05-15 | End: 2023-05-17

## 2023-05-15 RX ORDER — AMLODIPINE BESYLATE 2.5 MG/1
5 TABLET ORAL DAILY
Refills: 0 | Status: DISCONTINUED | OUTPATIENT
Start: 2023-05-15 | End: 2023-05-23

## 2023-05-15 RX ORDER — SENNA PLUS 8.6 MG/1
2 TABLET ORAL AT BEDTIME
Refills: 0 | Status: DISCONTINUED | OUTPATIENT
Start: 2023-05-15 | End: 2023-05-23

## 2023-05-15 RX ORDER — ATORVASTATIN CALCIUM 80 MG/1
20 TABLET, FILM COATED ORAL AT BEDTIME
Refills: 0 | Status: DISCONTINUED | OUTPATIENT
Start: 2023-05-15 | End: 2023-05-23

## 2023-05-15 RX ORDER — HEPARIN SODIUM 5000 [USP'U]/ML
5000 INJECTION INTRAVENOUS; SUBCUTANEOUS EVERY 8 HOURS
Refills: 0 | Status: DISCONTINUED | OUTPATIENT
Start: 2023-05-15 | End: 2023-05-23

## 2023-05-15 RX ADMIN — Medication 1300 MILLIGRAM(S): at 21:29

## 2023-05-15 RX ADMIN — DULOXETINE HYDROCHLORIDE 30 MILLIGRAM(S): 30 CAPSULE, DELAYED RELEASE ORAL at 14:20

## 2023-05-15 RX ADMIN — Medication 325 MILLIGRAM(S): at 14:19

## 2023-05-15 RX ADMIN — Medication 650 MILLIGRAM(S): at 00:01

## 2023-05-15 RX ADMIN — POLYETHYLENE GLYCOL 3350 17 GRAM(S): 17 POWDER, FOR SOLUTION ORAL at 11:25

## 2023-05-15 RX ADMIN — Medication 1 MILLIGRAM(S): at 14:18

## 2023-05-15 RX ADMIN — Medication 667 MILLIGRAM(S): at 11:43

## 2023-05-15 RX ADMIN — SODIUM ZIRCONIUM CYCLOSILICATE 10 GRAM(S): 10 POWDER, FOR SUSPENSION ORAL at 14:19

## 2023-05-15 RX ADMIN — PANTOPRAZOLE SODIUM 40 MILLIGRAM(S): 20 TABLET, DELAYED RELEASE ORAL at 05:03

## 2023-05-15 RX ADMIN — CHLORHEXIDINE GLUCONATE 1 APPLICATION(S): 213 SOLUTION TOPICAL at 05:03

## 2023-05-15 RX ADMIN — Medication 650 MILLIGRAM(S): at 14:21

## 2023-05-15 RX ADMIN — Medication 667 MILLIGRAM(S): at 08:44

## 2023-05-15 RX ADMIN — Medication 650 MILLIGRAM(S): at 05:04

## 2023-05-15 RX ADMIN — Medication 667 MILLIGRAM(S): at 17:34

## 2023-05-15 RX ADMIN — PANTOPRAZOLE SODIUM 40 MILLIGRAM(S): 20 TABLET, DELAYED RELEASE ORAL at 17:34

## 2023-05-15 RX ADMIN — SENNA PLUS 2 TABLET(S): 8.6 TABLET ORAL at 21:29

## 2023-05-15 RX ADMIN — Medication 40 MILLIGRAM(S): at 11:24

## 2023-05-15 RX ADMIN — SODIUM ZIRCONIUM CYCLOSILICATE 10 GRAM(S): 10 POWDER, FOR SUSPENSION ORAL at 21:29

## 2023-05-15 RX ADMIN — ATORVASTATIN CALCIUM 20 MILLIGRAM(S): 80 TABLET, FILM COATED ORAL at 21:30

## 2023-05-15 RX ADMIN — Medication 100 MILLIGRAM(S): at 14:19

## 2023-05-15 RX ADMIN — HEPARIN SODIUM 5000 UNIT(S): 5000 INJECTION INTRAVENOUS; SUBCUTANEOUS at 14:21

## 2023-05-15 RX ADMIN — AMLODIPINE BESYLATE 5 MILLIGRAM(S): 2.5 TABLET ORAL at 14:18

## 2023-05-15 RX ADMIN — HEPARIN SODIUM 5000 UNIT(S): 5000 INJECTION INTRAVENOUS; SUBCUTANEOUS at 21:29

## 2023-05-15 RX ADMIN — Medication 200 MILLIGRAM(S): at 05:03

## 2023-05-15 NOTE — PROGRESS NOTE ADULT - ASSESSMENT
ASSESSMENT:  BREA ALEJANDRE is a 82y Female with history of CKD stage 4 (Cr 8.42), CVA presenting to the ED w/ anemia and abd pain. Found to be bradycardic, but HDS w/ junctional rhythm likely 2/2 medication, kidney disease, anemia. Anemia likely 2/2 renal/chronic disease. Admitted to ICU for HD monitoring.     PLAN:  NEURO:  -Monitor mental status  -Imaging?  -Pain management?  -Sedation?    RESPIRATORY:   -Intubated? Resp support?  -Monitor respiratory status  -Monitor O2 sat  -F/u abg, CXR    CARDIOVASCULAR:  -Echo?  -Home meds?  -Pressors?  -Monitor VS and perfusion status  -Trend lactate?    GI/NUTRITION:  -Feeds?  -Antiemetic?  -NG/OG tube?  -GI ppx?  -Bowel regimen?    GENITOURINARY/RENAL:  -Output?  -Albarado?  -Fluids?  -Monitor electrolytes  -CrCl?    HEMATOLOGIC:  -Monitor H&H  -DVT ppx?    INFECTIOUS DISEASE:  -Abx?  -BCx/UCx?  -Monitor for fevers, trend WBC    ENDOCRINE:  -Monitor glucose  -ISS?    GOALS OF CARE:  -Family/Health Care Proxy  -Full Code?     Lines:    DISPO: [email or web address]           ASSESSMENT:  BREA ALEJANDRE is a 82y Female with history of CKD stage 4 (Cr 8.42), CVA presenting to the ED w/ anemia and abd pain. Found to be bradycardic, but HDS w/ junctional rhythm likely 2/2 medication, kidney disease, anemia. Anemia likely 2/2 renal/chronic disease. Admitted to ICU for HD monitoring.     PLAN:  NEURO:  -No acute issues  -Monitor mental status  -Imaging?  -Pain management?  -Sedation?    RESPIRATORY:   -Intubated? Resp support?  -Monitor respiratory status  -Monitor O2 sat  -F/u abg, CXR    CARDIOVASCULAR:  -Echo?  -Home meds?  -Pressors?  -Monitor VS and perfusion status  -Trend lactate?    GI/NUTRITION:  -Feeds?  -Antiemetic?  -NG/OG tube?  -GI ppx?  -Bowel regimen?    GENITOURINARY/RENAL:  -Output?  -Albarado?  -Fluids?  -Monitor electrolytes  -CrCl?    HEMATOLOGIC:  -Monitor H&H  -DVT ppx?    INFECTIOUS DISEASE:  -Abx?  -BCx/UCx?  -Monitor for fevers, trend WBC    ENDOCRINE:  -Monitor glucose  -ISS?    GOALS OF CARE:  -Family/Health Care Proxy  -Full Code?     Lines:    DISPO: [email or web address]           ASSESSMENT:  BREA ALEJANDRE is a 82y Female with history of CKD stage 4 (Cr 8.42), CVA presenting to the ED w/ anemia and abd pain. Found to be bradycardic, but HDS w/ junctional rhythm likely 2/2 medication, kidney disease, anemia. Anemia likely 2/2 renal/chronic disease. Admitted to ICU for HD monitoring.     PLAN:  NEURO:  -No acute issues  -Resume home 30 mg PO duloxetine daily  -Monitor mental status    RESPIRATORY:   -Subjective SOB and dry cough on hospital arrival. CT AP w/ bilat small effusions. RVP neg. BNP elevated in setting or renal disease.  -Monitor respiratory status  -Tessalon perles for cough    CARDIOVASCULAR:  -Junctional escape rhythm in ED on arrival, improved spontaneously after prbc's. Likely 2/2 to home medications/renal disease.  -Continue home meds: amlodipine 5 mg daily, lipitor 20 mg daily.   -Holding home lopressor in setting of bradycardia with junctional escape rhythm  -F/u TTE  -Monitor VS and perfusion status    GI/NUTRITION:  -ED presentation w/ abd pain, CT w/ moderate stool in rectal vault, no other acute findings  -PO diet  -Continue 40 mg protonix BID  -Continue bowel regimen    GENITOURINARY/RENAL:  -CKD 4; baseline Cr ~8  -Albarado in place  -S/p 80 mg lasix after prbc's; continue home dose 40 mg daily  -Continue Phoslo and PO sodium bicarbonate  -Monitor electrolytes and UOP closely  -; f/u renal recommendations; consider resuming home spironolactone    HEMATOLOGIC:  -Anemia to hgb 6.2 on arrival. Likely 2/2 to chronic disease, less likely GIB. Improved after 1u prbc's.   -Monitor H&H  -Consider resuming DVT ppx    INFECTIOUS DISEASE:  -No acute issues  -UA and RVP neg  -F/u Ucx  -Monitor for fevers, trend WBC    ENDOCRINE:  -No acute issues  -TSH wnl  -Monitor glucose; goal <180 mg/dL    GOALS OF CARE:  -Pt declining HD  -Family/Health Care Proxy: son  -Full Code    Lines:   -Albarado 5/14    DISPO: MICU

## 2023-05-15 NOTE — PROGRESS NOTE ADULT - SUBJECTIVE AND OBJECTIVE BOX
Patient is a 82y old  Female who presents with a chief complaint of bradycardia and anemia (15 May 2023 12:34)    PAST MEDICAL & SURGICAL HISTORY:  Stage 5 chronic kidney disease not on chronic dialysis      Anemia      No significant past surgical history        BLONDIE ALEJANDRE   82y    Female    BRIEF HOSPITAL COURSE:    Review of Systems:  No SOB or CP                      All other ROS are negative.    Allergies    No Known Allergies    Intolerances          ICU Vital Signs Last 24 Hrs  T(C): 36.7 (15 May 2023 20:00), Max: 36.7 (15 May 2023 20:00)  T(F): 98 (15 May 2023 20:00), Max: 98 (15 May 2023 20:00)  HR: 69 (15 May 2023 20:00) (32 - 81)  BP: 148/70 (15 May 2023 20:00) (119/59 - 177/86)  BP(mean): 89 (15 May 2023 20:00) (72 - 113)  ABP: --  ABP(mean): --  RR: 23 (15 May 2023 20:00) (12 - 27)  SpO2: 96% (15 May 2023 20:00) (91% - 100%)    O2 Parameters below as of 15 May 2023 19:02  Patient On (Oxygen Delivery Method): room air          Physical Examination:    General: NAD    HEENT: no JVD     PULM: bilateral BS     CVS: s1 s2 reg    ABD: soft NT    EXT: no edema     SKIN: warm     Neuro: Alert awake           LABS:                        7.1    11.16 )-----------( 294      ( 15 May 2023 11:05 )             22.1     05-15    135  |  108  |  115<H>  ----------------------------<  106<H>  5.0   |  16<L>  |  9.03<H>    Ca    8.4<L>      15 May 2023 11:05  Phos  8.3     05-15  Mg     2.5     05-15    TPro  6.2  /  Alb  2.7<L>  /  TBili  0.3  /  DBili  x   /  AST  11<L>  /  ALT  12  /  AlkPhos  70  05-15          CAPILLARY BLOOD GLUCOSE        PT/INR - ( 15 May 2023 03:00 )   PT: 11.4 sec;   INR: 0.96 ratio         PTT - ( 15 May 2023 03:00 )  PTT:32.3 sec  Urinalysis Basic - ( 15 May 2023 11:05 )    Color: Yellow / Appearance: Clear / S.010 / pH: x  Gluc: x / Ketone: Negative  / Bili: Negative / Urobili: Negative mg/dL   Blood: x / Protein: 500 mg/dL / Nitrite: Negative   Leuk Esterase: Small / RBC: 0-2 /HPF / WBC 6-10   Sq Epi: x / Non Sq Epi: x / Bacteria: Few      CULTURES:  Rapid RVP Result: Oscar ( @ 16:35)      Medications:  MEDICATIONS  (STANDING):  amLODIPine   Tablet 5 milliGRAM(s) Oral daily  atorvastatin 20 milliGRAM(s) Oral at bedtime  calcium acetate 667 milliGRAM(s) Oral three times a day with meals  chlorhexidine 2% Cloths 1 Application(s) Topical <User Schedule>  DULoxetine 30 milliGRAM(s) Oral daily  ferrous    sulfate 325 milliGRAM(s) Oral daily  folic acid 1 milliGRAM(s) Oral daily  heparin   Injectable 5000 Unit(s) SubCutaneous every 8 hours  pantoprazole    Tablet 40 milliGRAM(s) Oral every 12 hours  polyethylene glycol 3350 17 Gram(s) Oral daily  senna 2 Tablet(s) Oral at bedtime  sodium bicarbonate 1300 milliGRAM(s) Oral three times a day  sodium zirconium cyclosilicate 10 Gram(s) Oral every 8 hours    MEDICATIONS  (PRN):  benzonatate 100 milliGRAM(s) Oral every 8 hours PRN Cough         @ :01  -  05-15 @ 07:00  --------------------------------------------------------  IN: 0 mL / OUT: 650 mL / NET: -650 mL    05-15 @ :  -  05-15 @ 20:58  --------------------------------------------------------  IN: 700 mL / OUT: 1000 mL / NET: -300 mL        RADIOLOGY/IMAGING/ECHO  < from: CT Abdomen and Pelvis No Cont (23 @ 19:57) >  IMPRESSION:  Moderate amount of stool in the rectal vault. No evidence of   inflammation. Small bilateral effusions. Other findings as above.      < end of copied text >      Assessment/Plan:    82 F known CKD 4 P/W bradycardia and anemia     CKD 5 with metabolic acidosis   oral Bicarb ordered   patient refusing RRT HD     Here with severe anemia.  S/p a unit of packed cells this is chronic   ?? epogen will d/w nephrology  Not bleeding     Junctional Rhythm resolved now SR   Holding BB    Can transfer to floor.        DVT P

## 2023-05-15 NOTE — PROGRESS NOTE ADULT - SUBJECTIVE AND OBJECTIVE BOX
BREA ALEJANDRE  82y  Patient is a 82y old  Female who presents with a chief complaint of bradycardia and anemia (15 May 2023 08:14)    HPI:  Seen and examined at bedside. Admitted for Bradycardia and worsening azotemia. She feels better but is still dyspneic.    HEALTH ISSUES - PROBLEM Dx:        MEDICATIONS  (STANDING):  amLODIPine   Tablet 5 milliGRAM(s) Oral daily  atorvastatin 20 milliGRAM(s) Oral at bedtime  calcium acetate 667 milliGRAM(s) Oral three times a day with meals  chlorhexidine 2% Cloths 1 Application(s) Topical <User Schedule>  DULoxetine 30 milliGRAM(s) Oral daily  ferrous    sulfate 325 milliGRAM(s) Oral daily  folic acid 1 milliGRAM(s) Oral daily  pantoprazole    Tablet 40 milliGRAM(s) Oral every 12 hours  polyethylene glycol 3350 17 Gram(s) Oral daily  senna 2 Tablet(s) Oral at bedtime  sodium bicarbonate 650 milliGRAM(s) Oral three times a day  sodium zirconium cyclosilicate 10 Gram(s) Oral every 8 hours    MEDICATIONS  (PRN):  benzonatate 100 milliGRAM(s) Oral every 8 hours PRN Cough    Vital Signs Last 24 Hrs  T(C): 35.8 (15 May 2023 12:00), Max: 36.7 (14 May 2023 15:39)  T(F): 96.5 (15 May 2023 12:00), Max: 98 (14 May 2023 15:39)  HR: 64 (15 May 2023 12:00) (32 - 81)  BP: 160/70 (15 May 2023 12:00) (119/59 - 163/67)  BP(mean): 95 (15 May 2023 12:00) (72 - 113)  RR: 15 (15 May 2023 12:00) (12 - 27)  SpO2: 99% (15 May 2023 12:00) (96% - 100%)    Parameters below as of 14 May 2023 20:30  Patient On (Oxygen Delivery Method): room air      Daily     Daily     PHYSICAL EXAM:  Constitutional: She  appears comfortable and not distressed. Not diaphoretic.    Neck:  The thyroid is normal. Trachea is midline.     Respiratory: The lungs are clear to auscultation. No dullness and expansion is normal.    Cardiovascular: S1 and S2 are normal. No mummurs, rubs or gallops are present.    Gastrointestinal: The abdomen is soft. No tenderness is present. No masses are present. Bowel sounds are normal.    Genitourinary: The bladder is not distended. No CVA tenderness is present.    Extremities: No edema is noted. No deformities are present.    Neurological: Cognition is normal. Tone, power and sensation are normal. Gait is steady.    Skin: No leasions are seen  or palpated.    Lymph Nodes: No lymphadenopathy is present.    Psychiatric: Mood is appropriate. No hallucinations or flight of ideas are noted.                              7.1    11.16 )-----------( 294      ( 15 May 2023 11:05 )             22.1     05-15    135  |  108  |  115<H>  ----------------------------<  106<H>  5.0   |  16<L>  |  9.03<H>    Ca    8.4<L>      15 May 2023 11:05  Phos  7.9     05-15  Mg     2.5     05-15    TPro  6.2  /  Alb  2.7<L>  /  TBili  0.3  /  DBili  x   /  AST  11<L>  /  ALT  12  /  AlkPhos  70  05-15    Urinalysis Basic - ( 15 May 2023 11:05 )    Color: Yellow / Appearance: Clear / S.010 / pH: x  Gluc: x / Ketone: Negative  / Bili: Negative / Urobili: Negative mg/dL   Blood: x / Protein: 500 mg/dL / Nitrite: Negative   Leuk Esterase: Small / RBC: 0-2 /HPF / WBC 6-10   Sq Epi: x / Non Sq Epi: x / Bacteria: Few     Attending Attestation (For Attendings USE Only)...

## 2023-05-15 NOTE — PHYSICAL THERAPY INITIAL EVALUATION ADULT - ADDITIONAL COMMENTS
Pt states she lives alone, ground level apartment, no CHETAN. Pt states she has been bedbound/W/C bound for the last 3 years due to old CVA. Pt states she has 3 rotating HHA's who assist 24/7 for all functional mobility and ADL's. Pt has a Rina lift, hospital bed, wheel chair, shower chair and rolling walker. Pt takes ambulette services for outside appointments, and recent had [facility]NS manage pressure injury.

## 2023-05-15 NOTE — CONSULT NOTE ADULT - ASSESSMENT
The chart has been reviewed but the patient has not yet been examined.  Full note to follow. Transient bradycardia (hemodynamically stable, apparently asymptomatic) in the setting of severe uremia, anemia an possibly AVN blockers.  Multiple medical issues and baseline dementia; currently not a candidate to consider for PPM and given the fact that she is now no longer bradycardic, would advise continued medical management as per Nephrology and Critical care.  Avoid further AVN blockers to manage HTN.  Palliative care consult if refusing HD.  Please reconsult as needed.

## 2023-05-15 NOTE — PROGRESS NOTE ADULT - ASSESSMENT
CKD 5:  Discussed HD with her, but she refuses.   I will discuss HD again with her son also.  PLAN:  - Increase NaHCO3 to 2 tabs TID.  - Discuss HD options with both patient and her son.  - Renal diet.  - Follow up BMP.    Metabolic Acidosis:  - Continue HCO3 as above.    Anemia:  Likely of ESRD.  - Fe Profile.  - Will start EPO.

## 2023-05-15 NOTE — PROGRESS NOTE ADULT - SUBJECTIVE AND OBJECTIVE BOX
82-year-old F w/PMH CKD stage 4 (baseline Crn 8.26)  not on dialysis (but has had urgent HD in the past), breast cancer status postresection, history of CVA with right-sided deficits had lab work done at outpt office with Hb of 6.7 and was told to go to ED. Pt also endorses dry cough abd pain for last few days.  She denies any orthopnea or any lower extremity swelling.  No fevers or chills.  She is nonambulatory at baseline but still urinates. Notably, patient is on a metoprolol at home. In the ED, pt found to be bradycardic to 30s with junctional rhythm on EKG. Pt A/Ox3 and asx with bradycardia and BP in 140s. Trops negative and EKG without ischemic changes. CBC with Hb of 6.2. Bicarb 15 and BUN/Crn 109/8.93.   ICU consulted for bradycardia.     Subjective: Pt seen and examined at the bedside. **** 82-year-old F w/PMH CKD stage 4 (baseline Crn 8.26)  not on dialysis (but has had urgent HD in the past), breast cancer status postresection, history of CVA with right-sided deficits had lab work done at outpt office with Hb of 6.7 and was told to go to ED. Pt also endorses dry cough abd pain for last few days.  She denies any orthopnea or any lower extremity swelling.  No fevers or chills.  She is nonambulatory at baseline but still urinates. Notably, patient is on a metoprolol at home. In the ED, pt found to be bradycardic to 30s with junctional rhythm on EKG. Pt A/Ox3 and asx with bradycardia and BP in 140s. Trops negative and EKG without ischemic changes. CBC with Hb of 6.2. Bicarb 15 and BUN/Crn 109/8.93.   ICU consulted for bradycardia.     Subjective: Pt seen and examined at the bedside. Reports she is overall feeling improved from yesterday. Has mild SOB but improving. States abd pain improving. 650 UOP overnight s/p 500 cc and 1u prbc, and 80 mg lasix yesterday.  82-year-old F w/PMH CKD stage 4 (baseline Crn 8.26)  not on dialysis (but has had urgent HD in the past), breast cancer status postresection, history of CVA with right-sided deficits had lab work done at outpt office with Hb of 6.7 and was told to go to ED. Pt also endorses dry cough abd pain for last few days.  She denies any orthopnea or any lower extremity swelling.  No fevers or chills.  She is nonambulatory at baseline but still urinates. Notably, patient is on a metoprolol at home. In the ED, pt found to be bradycardic to 30s with junctional rhythm on EKG. Pt A/Ox3 and asx with bradycardia and BP in 140s. Trops negative and EKG without ischemic changes. CBC with Hb of 6.2. Bicarb 15 and BUN/Crn 109/8.93.   ICU consulted for bradycardia.     COVERING RESIDENT: Adolfo Cain (PGY-3)    INTERVAL HPI/OVERNIGHT EVENTS: No acute overnight events.    Subjective: Pt seen and examined at the bedside. Reports she is overall feeling improved from yesterday. Has mild SOB but improving. States abd pain improving. 650 UOP overnight s/p 500 cc and 1u prbc, and 80 mg lasix yesterday.     OBJECTIVE:    VITAL SIGNS:  ICU Vital Signs Last 24 Hrs  T(C): 35.8 (15 May 2023 12:00), Max: 36.7 (14 May 2023 15:39)  T(F): 96.5 (15 May 2023 12:00), Max: 98 (14 May 2023 15:39)  HR: 64 (15 May 2023 12:00) (32 - 81)  BP: 160/70 (15 May 2023 12:00) (119/59 - 163/67)  BP(mean): 95 (15 May 2023 12:00) (72 - 113)  ABP: --  ABP(mean): --  RR: 15 (15 May 2023 12:00) (12 - 27)  SpO2: 99% (15 May 2023 12:00) (96% - 100%)    O2 Parameters below as of 14 May 2023 20:30  Patient On (Oxygen Delivery Method): room air    05-14 @ 07:01  -  -15 @ 07:00  --------------------------------------------------------  IN: 0 mL / OUT: 650 mL / NET: -650 mL    05-15 @ 07:01  -  05-15 @ 12:34  --------------------------------------------------------  IN: 200 mL / OUT: 425 mL / NET: -225 mL      CAPILLARY BLOOD GLUCOSE    PHYSICAL EXAM:    General: NAD  HEENT: NC/AT; PERRL, clear conjunctiva  Respiratory: CTA b/l  Cardiovascular: +S1/S2; RRR  Abdomen: soft, mildly diffusely ttp. ND; +BS x4  Extremities: WWP, 2+ peripheral pulses b/l; no LE edema  Skin: normal color and turgor; no rash  Neurological: AAOx3, no focal deficits    MEDICATIONS:  MEDICATIONS  (STANDING):  amLODIPine   Tablet 5 milliGRAM(s) Oral daily  atorvastatin 20 milliGRAM(s) Oral at bedtime  calcium acetate 667 milliGRAM(s) Oral three times a day with meals  chlorhexidine 2% Cloths 1 Application(s) Topical <User Schedule>  DULoxetine 30 milliGRAM(s) Oral daily  ferrous    sulfate 325 milliGRAM(s) Oral daily  folic acid 1 milliGRAM(s) Oral daily  pantoprazole    Tablet 40 milliGRAM(s) Oral every 12 hours  polyethylene glycol 3350 17 Gram(s) Oral daily  senna 2 Tablet(s) Oral at bedtime  sodium bicarbonate 650 milliGRAM(s) Oral three times a day  sodium zirconium cyclosilicate 10 Gram(s) Oral every 8 hours    MEDICATIONS  (PRN):  benzonatate 100 milliGRAM(s) Oral every 8 hours PRN Cough    ALLERGIES:  Allergies    No Known Allergies    Intolerances      LABS:                        7.1    11.16 )-----------( 294      ( 15 May 2023 11:05 )             22.1     05-15    135  |  108  |  115<H>  ----------------------------<  106<H>  5.0   |  16<L>  |  9.03<H>    Ca    8.4<L>      15 May 2023 11:05  Phos  7.9     05-15  Mg     2.5     -15    TPro  6.2  /  Alb  2.7<L>  /  TBili  0.3  /  DBili  x   /  AST  11<L>  /  ALT  12  /  AlkPhos  70  05-15    PT/INR - ( 15 May 2023 03:00 )   PT: 11.4 sec;   INR: 0.96 ratio         PTT - ( 15 May 2023 03:00 )  PTT:32.3 sec  Urinalysis Basic - ( 15 May 2023 11:05 )    Color: Yellow / Appearance: Clear / S.010 / pH: x  Gluc: x / Ketone: Negative  / Bili: Negative / Urobili: Negative mg/dL   Blood: x / Protein: 500 mg/dL / Nitrite: Negative   Leuk Esterase: Small / RBC: 0-2 /HPF / WBC 6-10   Sq Epi: x / Non Sq Epi: x / Bacteria: Few        RADIOLOGY & ADDITIONAL TESTS: Reviewed.

## 2023-05-15 NOTE — CONSULT NOTE ADULT - SUBJECTIVE AND OBJECTIVE BOX
CARDIOLOGY CONSULTATION NOTE                                                                             BREA ALEJANDRE is a 82y Female w/PMH CKD stage 4 (baseline Crn 8.26)  not on dialysis (but has had urgent HD in the past), breast cancer (s/p resection), h/o CVA (residual right-sided deficits).  Sent from out patient office for Hb of 6.7. Also endorsed dry cough abd pain x few days.  No orthopnea or edema, no fevers or chills. Denied any hematemesis, melena, or any other signs of bleeding. She is nonambulatory at baseline. Notably, patient was on Metoprolol at home. In the ED, pt found to be bradycardic to 30s with junctional rhythm on EKG.  Pt A/Ox3 and asx with bradycardia and BP in 140s. Trops negative and EKG without ischemic changes. CBC with Hb of 6.2. Bicarb 15 and BUN/Crn 109/8.93. Admitted to ICU for monitoring for bradycardia.    REVIEW OF SYSTEMS: -----------------------------  CONSTITUTIONAL: No fever, weight loss, or fatigue EYES: No eye pain, visual disturbances, or discharge ENMT:  No difficulty hearing, tinnitus, vertigo; No sinus or throat pain NECK: No pain or stiffness BREASTS: No pain, masses, or nipple discharge RESPIRATORY: No cough, wheezing, chills or hemoptysis; No shortness of breath CARDIOVASCULAR: See HPI GASTROINTESTINAL: No abdominal or epigastric pain. No nausea, vomiting, or hematemesis; No diarrhea or constipation. No melena or hematochezia. GENITOURINARY: No dysuria, frequency, hematuria, or incontinence NEUROLOGICAL: No headaches, memory loss, loss of strength, numbness, or tremors SKIN: No itching, burning, rashes, or lesions  LYMPH NODES: No enlarged glands ENDOCRINE: No heat or cold intolerance; No hair loss MUSCULOSKELETAL: No joint pain or swelling; No muscle, back, or extremity pain PSYCHIATRIC: No depression, anxiety, mood swings, or difficulty sleeping HEME/LYMPH: No easy bruising, or bleeding gums ALLERGY AND IMMUNOLOGIC: No hives or eczema  Home Medications:   MEDICATIONS  (STANDING): calcium acetate 667 milliGRAM(s) Oral three times a day with meals chlorhexidine 2% Cloths 1 Application(s) Topical <User Schedule> pantoprazole  Injectable 40 milliGRAM(s) IV Push two times a day sodium bicarbonate 650 milliGRAM(s) Oral three times a day   ALLERGIES: No Known Allergies   FAMILY HISTORY: No pertinent family history in first degree relatives    PHYSICAL EXAMINATION: ----------------------------- T(C): 35.8 (05-15-23 @ 05:00), Max: 36.7 (05-14-23 @ 15:39) HR: 61 (05-15-23 @ 07:00) (32 - 81) BP: 134/49 (05-15-23 @ 07:00) (119/59 - 163/67) RR: 12 (05-15-23 @ 07:00) (12 - 27) SpO2: 97% (05-15-23 @ 07:00) (96% - 100%) Wt(kg): --  05-14 @ 07:01  -  05-15 @ 07:00 -------------------------------------------------------- IN: Total IN: 0 mL  OUT:   Indwelling Catheter - Urethral (mL): 650 mL Total OUT: 650 mL  Total NET: -650 mL     Weight (kg): 91.3 (05-14 @ 20:30)  Constitutional: well developed, normal appearance, well groomed, well nourished, no deformities and no acute distress.  Eyes: the conjunctiva exhibited no abnormalities and the eyelids demonstrated no xanthelasmas.  HEENT: normal oral mucosa, no oral pallor and no oral cyanosis.  Neck: normal jugular venous A waves present, normal jugular venous V waves present and no jugular venous knutson A waves.  Pulmonary: no respiratory distress, normal respiratory rhythm and effort, no accessory muscle use and lungs were clear to auscultation bilaterally.  Cardiovascular: heart rate and rhythm were normal, normal S1 and S2 and no murmur, gallop, rub, heave or thrill are present.  Abdomen: soft, non-tender, no hepato-splenomegaly and no abdominal mass palpated.  Musculoskeletal: the gait could not be assessed..  Extremities: no clubbing of the fingernails, no localized cyanosis, no petechial hemorrhages and no ischemic changes.  Skin: normal skin color and pigmentation, no rash, no venous stasis, no skin lesions, no skin ulcer and no xanthoma was observed.  Psychiatric: oriented to person, place, and time, the affect was normal, the mood was normal and not feeling anxious.   ECG: -------    LABS:  -------- 05-15  133<L>  |  107  |  111<H> ----------------------------<  78 5.0   |  14<L>  |  8.87<H>  Ca    8.3<L>      15 May 2023 03:00 Phos  7.9     05-15 Mg     2.5     05-15  TPro  6.2  /  Alb  2.7<L>  /  TBili  0.3  /  DBili  x   /  AST  11<L>  /  ALT  12  /  AlkPhos  70  05-15                       7.4   11.34 )-----------( 291      ( 15 May 2023 03:00 )            24.0    PT/INR - ( 15 May 2023 03:00 )   PT: 11.4 sec;   INR: 0.96 ratio      PTT - ( 15 May 2023 03:00 )  PTT:32.3 sec      RADIOLOGY REPORTS: -----------------------------    ECHOCARDIOGRAM: ---------------------------      CARDIOLOGY CONSULTATION NOTE                                                                             BREA ALEJANDRE is a 82y Female w/PMH CKD stage 4 (baseline Crn 8.26)  not on dialysis (but has had urgent HD in the past), breast cancer (s/p resection), h/o CVA (residual right-sided deficits).  Sent from out patient office for Hb of 6.7. Also endorsed dry cough abd pain x few days.  No orthopnea or edema, no fevers or chills. Denied any hematemesis, melena, or any other signs of bleeding. She is nonambulatory at baseline. Notably, patient was on Metoprolol at home. In the ED, pt found to be bradycardic to 30s with junctional rhythm on EKG.  Pt A/Ox3 and asx with bradycardia and BP in 140s. Trops negative and EKG without ischemic changes. CBC with Hb of 6.2. Bicarb 15 and BUN/Crn 109/8.93. Admitted to ICU for monitoring for bradycardia.   Review of telemtry shows normalization of Vr's to low 50's.  REVIEW OF SYSTEMS: NA -----------------------------  Home Medications:   MEDICATIONS  (STANDING): calcium acetate 667 milliGRAM(s) Oral three times a day with meals chlorhexidine 2% Cloths 1 Application(s) Topical <User Schedule> pantoprazole  Injectable 40 milliGRAM(s) IV Push two times a day sodium bicarbonate 650 milliGRAM(s) Oral three times a day   ALLERGIES: No Known Allergies   FAMILY HISTORY: No pertinent family history in first degree relatives    PHYSICAL EXAMINATION: ----------------------------- T(C): 35.8 (05-15-23 @ 05:00), Max: 36.7 (05-14-23 @ 15:39) HR: 61 (05-15-23 @ 07:00) (32 - 81) BP: 134/49 (05-15-23 @ 07:00) (119/59 - 163/67) RR: 12 (05-15-23 @ 07:00) (12 - 27) SpO2: 97% (05-15-23 @ 07:00) (96% - 100%) Wt(kg): --  05-14 @ 07:01  -  05-15 @ 07:00 -------------------------------------------------------- IN: Total IN: 0 mL  OUT:   Indwelling Catheter - Urethral (mL): 650 mL Total OUT: 650 mL  Total NET: -650 mL     Weight (kg): 91.3 (05-14 @ 20:30)  Constitutional: well developed, normal appearance, well nourished, no deformities and no acute distress.  Eyes: the conjunctiva exhibited no abnormalities and the eyelids demonstrated no xanthelasmas.  HEENT: normal oral mucosa, no oral pallor and no oral cyanosis.  Neck: normal jugular venous A waves present, normal jugular venous V waves present and no jugular venous knutson A waves.  Pulmonary: no respiratory distress, normal respiratory rhythm and effort, no accessory muscle use and lungs were clear to auscultation bilaterally.  Cardiovascular: heart rate and rhythm were normal, normal S1 and S2 and no murmur, gallop, rub, heave or thrill are present.  Abdomen: soft, non-tender, no hepato-splenomegaly and no abdominal mass palpated.  Musculoskeletal: the gait could not be assessed..  Extremities: no clubbing of the fingernails, no localized cyanosis, no petechial hemorrhages and no ischemic changes.  Skin: normal skin color and pigmentation, no rash, no venous stasis, no skin lesions, no skin ulcer and no xanthoma was observed.  Psychiatric: oriented to person, place, and time, the affect was normal, the mood was normal and not feeling anxious.   ECG: -------    LABS:  -------- 05-15  133<L>  |  107  |  111<H> ----------------------------<  78 5.0   |  14<L>  |  8.87<H>  Ca    8.3<L>      15 May 2023 03:00 Phos  7.9     05-15 Mg     2.5     05-15  TPro  6.2  /  Alb  2.7<L>  /  TBili  0.3  /  DBili  x   /  AST  11<L>  /  ALT  12  /  AlkPhos  70  05-15                       7.4   11.34 )-----------( 291      ( 15 May 2023 03:00 )            24.0    PT/INR - ( 15 May 2023 03:00 )   PT: 11.4 sec;   INR: 0.96 ratio      PTT - ( 15 May 2023 03:00 )  PTT:32.3 sec      RADIOLOGY REPORTS: -----------------------------    ECHOCARDIOGRAM: ---------------------------

## 2023-05-15 NOTE — PHYSICAL THERAPY INITIAL EVALUATION ADULT - PERTINENT HX OF CURRENT PROBLEM, REHAB EVAL
82y Female with history of CKD stage 4 (Cr 8.42), CVA presenting to the ED w/ anemia and abd pain. Found to be bradycardic, but HDS w/ junctional rhythm likely 2/2 medication, kidney disease, anemia. Anemia likely 2/2 renal/chronic disease. Admitted to ICU for HD monitoring. PT W/C consulted for healed sacral pressure injury.

## 2023-05-15 NOTE — PHYSICAL THERAPY INITIAL EVALUATION ADULT - NSPTDISCHREC_GEN_A_CORE
Nursing to continue incontinence care and offloading./No skilled PT needs Nursing to continue incontinence care and frequent offloading./No skilled PT needs

## 2023-05-15 NOTE — PHYSICAL THERAPY INITIAL EVALUATION ADULT - GENERAL OBSERVATIONS, REHAB EVAL
Pt found semi supine in bed in NAD, +hep lock, +tele, +foam dressing / moisture barrier cream to sacral region, +marcano cath, agreeable to PT W/C DEBBIE dean aware.

## 2023-05-16 DIAGNOSIS — Z51.5 ENCOUNTER FOR PALLIATIVE CARE: ICD-10-CM

## 2023-05-16 DIAGNOSIS — R53.2 FUNCTIONAL QUADRIPLEGIA: ICD-10-CM

## 2023-05-16 DIAGNOSIS — R63.0 ANOREXIA: ICD-10-CM

## 2023-05-16 DIAGNOSIS — N17.9 ACUTE KIDNEY FAILURE, UNSPECIFIED: ICD-10-CM

## 2023-05-16 DIAGNOSIS — R10.9 UNSPECIFIED ABDOMINAL PAIN: ICD-10-CM

## 2023-05-16 DIAGNOSIS — M79.605 PAIN IN LEFT LEG: ICD-10-CM

## 2023-05-16 LAB
ALBUMIN SERPL ELPH-MCNC: 2.6 G/DL — LOW (ref 3.3–5)
ALP SERPL-CCNC: 81 U/L — SIGNIFICANT CHANGE UP (ref 40–120)
ALT FLD-CCNC: 8 U/L — LOW (ref 12–78)
ANION GAP SERPL CALC-SCNC: 8 MMOL/L — SIGNIFICANT CHANGE UP (ref 5–17)
AST SERPL-CCNC: 7 U/L — LOW (ref 15–37)
BASOPHILS # BLD AUTO: 0.01 K/UL — SIGNIFICANT CHANGE UP (ref 0–0.2)
BASOPHILS NFR BLD AUTO: 0.1 % — SIGNIFICANT CHANGE UP (ref 0–2)
BILIRUB SERPL-MCNC: 0.4 MG/DL — SIGNIFICANT CHANGE UP (ref 0.2–1.2)
BUN SERPL-MCNC: 117 MG/DL — HIGH (ref 7–23)
CALCIUM SERPL-MCNC: 8.3 MG/DL — LOW (ref 8.5–10.1)
CHLORIDE SERPL-SCNC: 108 MMOL/L — SIGNIFICANT CHANGE UP (ref 96–108)
CO2 SERPL-SCNC: 19 MMOL/L — LOW (ref 22–31)
CREAT SERPL-MCNC: 9.29 MG/DL — HIGH (ref 0.5–1.3)
EGFR: 4 ML/MIN/1.73M2 — LOW
EOSINOPHIL # BLD AUTO: 0.14 K/UL — SIGNIFICANT CHANGE UP (ref 0–0.5)
EOSINOPHIL NFR BLD AUTO: 1.1 % — SIGNIFICANT CHANGE UP (ref 0–6)
GLUCOSE SERPL-MCNC: 85 MG/DL — SIGNIFICANT CHANGE UP (ref 70–99)
HCT VFR BLD CALC: 21.2 % — LOW (ref 34.5–45)
HGB BLD-MCNC: 6.8 G/DL — CRITICAL LOW (ref 11.5–15.5)
IMM GRANULOCYTES NFR BLD AUTO: 0.6 % — SIGNIFICANT CHANGE UP (ref 0–0.9)
LYMPHOCYTES # BLD AUTO: 1.6 K/UL — SIGNIFICANT CHANGE UP (ref 1–3.3)
LYMPHOCYTES # BLD AUTO: 12.7 % — LOW (ref 13–44)
MAGNESIUM SERPL-MCNC: 2.6 MG/DL — SIGNIFICANT CHANGE UP (ref 1.6–2.6)
MCHC RBC-ENTMCNC: 26.3 PG — LOW (ref 27–34)
MCHC RBC-ENTMCNC: 32.1 G/DL — SIGNIFICANT CHANGE UP (ref 32–36)
MCV RBC AUTO: 81.9 FL — SIGNIFICANT CHANGE UP (ref 80–100)
MONOCYTES # BLD AUTO: 0.61 K/UL — SIGNIFICANT CHANGE UP (ref 0–0.9)
MONOCYTES NFR BLD AUTO: 4.8 % — SIGNIFICANT CHANGE UP (ref 2–14)
NEUTROPHILS # BLD AUTO: 10.2 K/UL — HIGH (ref 1.8–7.4)
NEUTROPHILS NFR BLD AUTO: 80.7 % — HIGH (ref 43–77)
NRBC # BLD: 0 /100 WBCS — SIGNIFICANT CHANGE UP (ref 0–0)
OB PNL STL: NEGATIVE — SIGNIFICANT CHANGE UP
PHOSPHATE SERPL-MCNC: 7.3 MG/DL — HIGH (ref 2.5–4.5)
PLATELET # BLD AUTO: 287 K/UL — SIGNIFICANT CHANGE UP (ref 150–400)
POTASSIUM SERPL-MCNC: 4.6 MMOL/L — SIGNIFICANT CHANGE UP (ref 3.5–5.3)
POTASSIUM SERPL-SCNC: 4.6 MMOL/L — SIGNIFICANT CHANGE UP (ref 3.5–5.3)
PROT SERPL-MCNC: 6.1 GM/DL — SIGNIFICANT CHANGE UP (ref 6–8.3)
RBC # BLD: 2.59 M/UL — LOW (ref 3.8–5.2)
RBC # FLD: 22 % — HIGH (ref 10.3–14.5)
SODIUM SERPL-SCNC: 135 MMOL/L — SIGNIFICANT CHANGE UP (ref 135–145)
WBC # BLD: 12.63 K/UL — HIGH (ref 3.8–10.5)
WBC # FLD AUTO: 12.63 K/UL — HIGH (ref 3.8–10.5)

## 2023-05-16 PROCEDURE — 99233 SBSQ HOSP IP/OBS HIGH 50: CPT | Mod: GC

## 2023-05-16 PROCEDURE — 99223 1ST HOSP IP/OBS HIGH 75: CPT

## 2023-05-16 RX ORDER — ACETAMINOPHEN 500 MG
650 TABLET ORAL EVERY 6 HOURS
Refills: 0 | Status: DISCONTINUED | OUTPATIENT
Start: 2023-05-16 | End: 2023-05-23

## 2023-05-16 RX ORDER — OXYCODONE HYDROCHLORIDE 5 MG/1
2.5 TABLET ORAL EVERY 6 HOURS
Refills: 0 | Status: DISCONTINUED | OUTPATIENT
Start: 2023-05-16 | End: 2023-05-23

## 2023-05-16 RX ADMIN — DULOXETINE HYDROCHLORIDE 30 MILLIGRAM(S): 30 CAPSULE, DELAYED RELEASE ORAL at 11:29

## 2023-05-16 RX ADMIN — Medication 667 MILLIGRAM(S): at 11:33

## 2023-05-16 RX ADMIN — OXYCODONE HYDROCHLORIDE 2.5 MILLIGRAM(S): 5 TABLET ORAL at 13:08

## 2023-05-16 RX ADMIN — Medication 667 MILLIGRAM(S): at 17:30

## 2023-05-16 RX ADMIN — Medication 1300 MILLIGRAM(S): at 13:04

## 2023-05-16 RX ADMIN — Medication 1 MILLIGRAM(S): at 11:29

## 2023-05-16 RX ADMIN — CHLORHEXIDINE GLUCONATE 1 APPLICATION(S): 213 SOLUTION TOPICAL at 05:34

## 2023-05-16 RX ADMIN — Medication 1300 MILLIGRAM(S): at 05:33

## 2023-05-16 RX ADMIN — PANTOPRAZOLE SODIUM 40 MILLIGRAM(S): 20 TABLET, DELAYED RELEASE ORAL at 05:34

## 2023-05-16 RX ADMIN — SODIUM ZIRCONIUM CYCLOSILICATE 10 GRAM(S): 10 POWDER, FOR SUSPENSION ORAL at 21:31

## 2023-05-16 RX ADMIN — POLYETHYLENE GLYCOL 3350 17 GRAM(S): 17 POWDER, FOR SOLUTION ORAL at 11:32

## 2023-05-16 RX ADMIN — OXYCODONE HYDROCHLORIDE 2.5 MILLIGRAM(S): 5 TABLET ORAL at 19:59

## 2023-05-16 RX ADMIN — SODIUM ZIRCONIUM CYCLOSILICATE 10 GRAM(S): 10 POWDER, FOR SUSPENSION ORAL at 13:04

## 2023-05-16 RX ADMIN — Medication 1300 MILLIGRAM(S): at 21:32

## 2023-05-16 RX ADMIN — ATORVASTATIN CALCIUM 20 MILLIGRAM(S): 80 TABLET, FILM COATED ORAL at 21:32

## 2023-05-16 RX ADMIN — PANTOPRAZOLE SODIUM 40 MILLIGRAM(S): 20 TABLET, DELAYED RELEASE ORAL at 17:30

## 2023-05-16 RX ADMIN — SENNA PLUS 2 TABLET(S): 8.6 TABLET ORAL at 21:35

## 2023-05-16 RX ADMIN — OXYCODONE HYDROCHLORIDE 2.5 MILLIGRAM(S): 5 TABLET ORAL at 13:30

## 2023-05-16 RX ADMIN — Medication 100 MILLIGRAM(S): at 05:33

## 2023-05-16 RX ADMIN — Medication 325 MILLIGRAM(S): at 11:30

## 2023-05-16 RX ADMIN — AMLODIPINE BESYLATE 5 MILLIGRAM(S): 2.5 TABLET ORAL at 05:34

## 2023-05-16 RX ADMIN — SODIUM ZIRCONIUM CYCLOSILICATE 10 GRAM(S): 10 POWDER, FOR SUSPENSION ORAL at 05:34

## 2023-05-16 RX ADMIN — HEPARIN SODIUM 5000 UNIT(S): 5000 INJECTION INTRAVENOUS; SUBCUTANEOUS at 21:35

## 2023-05-16 RX ADMIN — HEPARIN SODIUM 5000 UNIT(S): 5000 INJECTION INTRAVENOUS; SUBCUTANEOUS at 13:04

## 2023-05-16 RX ADMIN — OXYCODONE HYDROCHLORIDE 2.5 MILLIGRAM(S): 5 TABLET ORAL at 20:26

## 2023-05-16 RX ADMIN — HEPARIN SODIUM 5000 UNIT(S): 5000 INJECTION INTRAVENOUS; SUBCUTANEOUS at 05:34

## 2023-05-16 RX ADMIN — Medication 667 MILLIGRAM(S): at 08:44

## 2023-05-16 NOTE — CONSULT NOTE ADULT - CONVERSATION DETAILS
Spoke with patient and her son at the bedside.  Patient said [place] is the one who would make her decisions if she was unable to make decisions.  Patient said she spoke with nephrologist about possibility of HD and she and her son discussed and plan to move forward with trial of HD.  Palliative to continue to follow for support with decision making.

## 2023-05-16 NOTE — CONSULT NOTE ADULT - PROBLEM SELECTOR RECOMMENDATION 6
Patient planning to initiate HD and see how she tolerates it.  Said her son, Edgardo would be her decision maker.       Discussed with DOUG Zuniga

## 2023-05-16 NOTE — DIETITIAN INITIAL EVALUATION ADULT - ORAL INTAKE PTA/DIET HISTORY
Pt seen c son @ bedside, who was assisting pt c feeding.  Pt without upper dentures, able to eat regular consistency as per pt/son.  Pt reports poor appetite for past month, c improved oral intake at present.  Diet PTA: low sodium, son did the shopping, HHA did the cooking.  Pt is bedbound.

## 2023-05-16 NOTE — PROGRESS NOTE ADULT - ASSESSMENT
CKD 5:  After further discussions, she will try HD again.  She will decide if she wants to continue  PLAN:  - Temporary HD catheter placement.  - HD afterwards.  - Renal diet.  - Follow up BMP.    Metabolic Acidosis:  - Continue HCO3 as above until HD    Anemia:  Likely of ESRD.  - Fe Profile.  - Will start EPO.

## 2023-05-16 NOTE — CONSULT NOTE ADULT - PROBLEM SELECTOR RECOMMENDATION 4
reports feeling decreased po intake for some time-greater than weeks  offer small more frequent meals, dietician consult appreciated

## 2023-05-16 NOTE — PROGRESS NOTE ADULT - ASSESSMENT
BREA ALEJANDRE is a 82y Female with history of CKD stage 4 (Cr 8.42), CVA presenting to the ED w/ anemia and abd pain. Found to be bradycardic, but HDS w/ junctional rhythm likely 2/2 medication, kidney disease, anemia. Anemia likely 2/2 renal/chronic disease. Admitted to ICU for HD monitoring.     PLAN:  NEURO:  -No acute issues  -Resume home 30 mg PO duloxetine daily  -Monitor mental status    RESPIRATORY:   -Subjective SOB and dry cough on hospital arrival. CT AP w/ bilat small effusions. RVP neg. BNP elevated in setting or renal disease.  -Monitor respiratory status  -Tessalon perles for cough    CARDIOVASCULAR:  -Junctional escape rhythm in ED on arrival, improved spontaneously after prbc's. Likely 2/2 to home medications/renal disease.  -Continue home meds: amlodipine 5 mg daily, lipitor 20 mg daily.   -Holding home lopressor in setting of bradycardia with junctional escape rhythm  -F/u TTE  -Monitor VS and perfusion status    GI/NUTRITION:  -ED presentation w/ abd pain, CT w/ moderate stool in rectal vault, no other acute findings  -PO diet  -Continue 40 mg protonix BID  -Continue bowel regimen    GENITOURINARY/RENAL:  -CKD 4; baseline Cr ~8  -Albarado in place  -S/p 80 mg lasix after prbc's; continue home dose 40 mg daily  -Continue Phoslo and PO sodium bicarbonate  -Monitor electrolytes and UOP closely  -; f/u renal recommendations; consider resuming home spironolactone    HEMATOLOGIC:  -Anemia to hgb 6.2 on arrival. Likely 2/2 to chronic disease, less likely GIB. Improved after 1u prbc's.   -Monitor H&H  -Consider resuming DVT ppx    INFECTIOUS DISEASE:  -No acute issues  -UA and RVP neg  -F/u Ucx  -Monitor for fevers, trend WBC    ENDOCRINE:  -No acute issues  -TSH wnl  -Monitor glucose; goal <180 mg/dL    GOALS OF CARE:  -Pt declining HD  -Family/Health Care Proxy: son  -Full Code    Lines:   -Verena 5/14    DISPO: MICU   BREA ALEJANDRE is a 82y Female with history of CKD stage 4 (Cr 8.42), CVA presenting to the ED w/ anemia and abd pain. Found to be bradycardic, but HDS w/ junctional rhythm likely 2/2 medication, kidney disease, anemia. Anemia likely 2/2 renal/chronic disease. Admitted to ICU for HD monitoring.     PLAN:  NEURO:  -No acute issues  -Resume home 30 mg PO duloxetine daily  -Monitor mental status    RESPIRATORY:   -Subjective SOB and dry cough on hospital arrival. CT AP w/ bilat small effusions. RVP neg. BNP elevated in setting or renal disease.  -Monitor respiratory status  -Tessalon perles for cough    CARDIOVASCULAR:  -Junctional escape rhythm in ED on arrival, improved spontaneously after prbc's. Likely 2/2 to home medications/renal disease.  -Continue home meds: amlodipine 5 mg daily, lipitor 20 mg daily.   -Holding home lopressor in setting of bradycardia with junctional escape rhythm; no further interventions at this time per cardiology.  -F/u TTE  -Monitor VS and perfusion status    GI/NUTRITION:  -ED presentation w/ abd pain, CT w/ moderate stool in rectal vault, no other acute findings  -PO diet  -Continue 40 mg protonix BID  -Continue bowel regimen    GENITOURINARY/RENAL:  -CKD 4; baseline Cr ~8  -Albarado in place  -S/p 80 mg lasix after prbc's; continue home dose 40 mg daily  -Continue Phoslo and PO sodium bicarbonate  -Monitor electrolytes and UOP closely  -;  -Pt declining HD; f/u renal recommendations; holding home spironolactone    HEMATOLOGIC:  -Anemia to hgb 6.2 on arrival. Likely 2/2 to chronic disease, less likely GIB. Improved after 1u prbc's.   -DVT ppx hep sq  -Hgb trending down to 6.8 overnight, monitor H&H  -Holding off on transfusion will discuss possibly starting Procrit w/ renal    INFECTIOUS DISEASE:  -No acute issues  -UA and RVP neg  -F/u Ucx  -Monitor for fevers, trend WBC    ENDOCRINE:  -No acute issues  -TSH wnl  -Monitor glucose; goal <180 mg/dL    GOALS OF CARE:  -Pt declining HD; discuss with palliative?  -Family/Health Care Proxy: son  -Full Code    Lines:   -Albarado 5/14    DISPO: MICU

## 2023-05-16 NOTE — CONSULT NOTE ADULT - ASSESSMENT
82 year old female PMH of CKD, CVA with right sided weakness and breast cancer presented to the ED with abdominal pain and cough.  Patient bradycardic and in renal failure.  Patient to trial initiation of HD.  Palliative care consulted for GOC.

## 2023-05-16 NOTE — DIETITIAN INITIAL EVALUATION ADULT - SIGNS/SYMPTOMS
Gyn Problem Visit    S: 46 yo  s/p RA-TLH on 10/7 presents with foul smelling vaginal discharge, urinary complaints, low back pain and fatigue. She also reports a piece of tissue coming out of her vagina into the toilet.  Her discharge smells like ammonia. She recently had the COVID vaccine as well.  She reports some urinary urgency and frequency as well.  She used Metrogel which cleared her BV but now it is coming back again.    O:   Visit Vitals  /70   Ht 5' 6\" (1.676 m)   Wt 89.4 kg   LMP 2020 (Approximate) Comment: Partial Hysterectomy    BMI 31.80 kg/m²     Gen: NAD, well nourished female  Pelvic:   -External genitalia: no visible periurethral, perineal and perianal lesions   -Urethral meatus: no lesions or prolapse visualized  -Urethra: no masses or tenderness  -Bladder: no fullness or masses palpated  -Vagina: vaginal mucosa pink, well rugated, with no gross lesions noted.    -Cervix: no lesions, Moderate white discharge noted--ammonia odor  -Anus/perineum: no palpable masses and no visible lesions    A/P: 46 yo  s/p RA-TLH on 10/7 presents with foul smelling vaginal discharge, urinary complaints, low back pain and fatigue.   1. Discharge and urinary complaints  -UA, Ucx sent  -Vaginal pathogens done  -Discussed other forms of treatment for BV or yeast  2. Fatigue/back pain  -Could be related to COVID vaccine  -Pt to cont to monitor  RTC in 1 year    Fanny Clifton D.O.  Department of Obstetrics and Gynecology    >50% spent on education and counseling over 15 min visit    
<50% nutrition needs > 5 day

## 2023-05-16 NOTE — DIETITIAN INITIAL EVALUATION ADULT - DIET TYPE
low sodium/no concentrated potassium/no concentrated phosphorus/60 gm protein Suplena 1 can daily=425 calories, 10 grams protein.  When HD is initiated, recommend change to renal for renal replacement, Nepro 1 x day = 425 calories, 19 grams protein per serving, fluid restriction as indicated/low sodium/no concentrated potassium/no concentrated phosphorus/60 gm protein/thin liquids

## 2023-05-16 NOTE — DIETITIAN INITIAL EVALUATION ADULT - PERTINENT LABORATORY DATA
05-16    135  |  108  |  117<H>  ----------------------------<  85  4.6   |  19<L>  |  9.29<H>    Ca    8.3<L>      16 May 2023 03:30  Phos  7.3     05-16  Mg     2.6     05-16    TPro  6.1  /  Alb  2.6<L>  /  TBili  0.4  /  DBili  x   /  AST  7<L>  /  ALT  8<L>  /  AlkPhos  81  05-16

## 2023-05-16 NOTE — DIETITIAN INITIAL EVALUATION ADULT - OTHER INFO
Per chart review, pt c hx of urgent HD in the past, pt/son refusing HD.  Pt/son educated on CKD nutrition therapy. Per chart review, pt c hx of urgent HD in the past, pt/son had refused HD, however pt/son at present decided for HD.   Pt/son educated on CKD nutrition therapy.

## 2023-05-16 NOTE — DIETITIAN INITIAL EVALUATION ADULT - PERTINENT MEDS FT
MEDICATIONS  (STANDING):  amLODIPine   Tablet 5 milliGRAM(s) Oral daily  atorvastatin 20 milliGRAM(s) Oral at bedtime  calcium acetate 667 milliGRAM(s) Oral three times a day with meals  chlorhexidine 2% Cloths 1 Application(s) Topical <User Schedule>  DULoxetine 30 milliGRAM(s) Oral daily  ferrous    sulfate 325 milliGRAM(s) Oral daily  folic acid 1 milliGRAM(s) Oral daily  heparin   Injectable 5000 Unit(s) SubCutaneous every 8 hours  pantoprazole    Tablet 40 milliGRAM(s) Oral every 12 hours  polyethylene glycol 3350 17 Gram(s) Oral daily  senna 2 Tablet(s) Oral at bedtime  sodium bicarbonate 1300 milliGRAM(s) Oral three times a day  sodium zirconium cyclosilicate 10 Gram(s) Oral every 8 hours    MEDICATIONS  (PRN):  benzonatate 100 milliGRAM(s) Oral every 8 hours PRN Cough

## 2023-05-16 NOTE — CONSULT NOTE ADULT - PROBLEM SELECTOR RECOMMENDATION 2
CKD history, follows with Dr. Sandhu, nephrology outpatient, Dr. Mata has been following her case during hospitalization  creatinine upward trending, plan to trial HD, patient will reevaluate intermittent bilateral lower leg pain, burning in nature  likely neuropathic, would defer gabapentin given renal failure at this time and favor low dose opioid, oxycodone 2.5mg q 4-6 hours PRN for pain

## 2023-05-16 NOTE — DIETITIAN INITIAL EVALUATION ADULT - REASON INDICATOR FOR ASSESSMENT
CCU adm, RN consult for pressure ulcer stage 2 or greater; however pressure ulcer parameter discontinued at present

## 2023-05-16 NOTE — CONSULT NOTE ADULT - PROBLEM SELECTOR RECOMMENDATION 3
reports feeling decreased po intake for some time-greater than weeks CKD history, follows with Dr. Sandhu, nephrology outpatient, Dr. Mata has been following her case during hospitalization  creatinine upward trending, plan to trial HD, patient will reevaluate how she tolerates HD  anemia, likely secondary to CKD, transfuse PRN

## 2023-05-16 NOTE — CONSULT NOTE ADULT - SUBJECTIVE AND OBJECTIVE BOX
HPI:  82-year-old F w/PMH CKD stage 4 (baseline Crn 8.26)  not on dialysis (but has had urgent HD in the past), breast cancer status postresection, history of CVA with right-sided deficits had lab work done at outpt office with Hb of 6.7 and was told to go to ED. Pt also endorses dry cough abd pain for last few days.  She denies any orthopnea or any lower extremity swelling.  No fevers or chills.  She is nonambulatory at baseline but still urinates. Notably, patient is on a metoprolol at home. In the ED, pt found to be bradycardic to 30s with junctional rhythm on EKG. Pt A/Ox3 and asx with bradycardia and BP in 140s. Trops negative and EKG without ischemic changes. CBC with Hb of 6.2. Bicarb 15 and BUN/Crn 109/8.93.   ICU consulted for bradycardia.     Subjective: Pt seen and examined at the bedside. Pt A/Ox4, satting well on RA, with BP in 140s and selene to 30s. Pt denies CP, SOB, dizziness, palpitations, or lightheadedness. Pt endorses persistent abd pain and cough but denies n/v/SOB. Denies any hematemesis, melena, or any other signs of bleeding    (14 May 2023 19:16)    PERTINENT PM/SXH:   Stage 5 chronic kidney disease not on chronic dialysis    Anemia      No significant past surgical history      FAMILY HISTORY:  No pertinent family history in first degree relatives      ITEMS NOT CHECKED ARE NOT PRESENT    SOCIAL HISTORY:   Significant other/partner:  [ ]  Children: yes [ ]  Uatsdin/Spirituality: Anglican  Substance hx:  [ ]   Tobacco hx:  [ ]   Alcohol hx: [ ]   Home Opioid hx:  [ ] I-Stop Reference No: Reference #: [id number]  Living Situation: [ x]Home  [ ]Long term care  [ ]Rehab [ ]Other    ADVANCE DIRECTIVES:    DNR  MOLST  [ ]  Living Will  [ ]   DECISION MAKER(s):  [ ] Health Care Proxy(s)  [x ] Surrogate(s)  [ ] Guardian           Name(s): Phone Number(s): Edgardo Purdy [phone]    BASELINE (I)ADL(s) (prior to admission):  [place]: [ ]Total  [ ] Moderate [ x]Dependent    Allergies    No Known Allergies    Intolerances    MEDICATIONS  (STANDING):  amLODIPine   Tablet 5 milliGRAM(s) Oral daily  atorvastatin 20 milliGRAM(s) Oral at bedtime  calcium acetate 667 milliGRAM(s) Oral three times a day with meals  chlorhexidine 2% Cloths 1 Application(s) Topical <User Schedule>  DULoxetine 30 milliGRAM(s) Oral daily  ferrous    sulfate 325 milliGRAM(s) Oral daily  folic acid 1 milliGRAM(s) Oral daily  heparin   Injectable 5000 Unit(s) SubCutaneous every 8 hours  pantoprazole    Tablet 40 milliGRAM(s) Oral every 12 hours  polyethylene glycol 3350 17 Gram(s) Oral daily  senna 2 Tablet(s) Oral at bedtime  sodium bicarbonate 1300 milliGRAM(s) Oral three times a day  sodium zirconium cyclosilicate 10 Gram(s) Oral every 8 hours    MEDICATIONS  (PRN):  acetaminophen     Tablet .. 650 milliGRAM(s) Oral every 6 hours PRN Mild Pain (1 - 3), Moderate Pain (4 - 6)  benzonatate 100 milliGRAM(s) Oral every 8 hours PRN Cough  oxyCODONE    IR 2.5 milliGRAM(s) Oral every 6 hours PRN Severe Pain (7 - 10)    PRESENT SYMPTOMS: [ ]Unable to obtain due to poor mentation   Source if other than patient:  [ ]Family   [ ]Team     Pain: [ ] yes [x ] no  QOL impact -   Location -                    Aggravating factors -  Quality -  Radiation -  Timing-  Severity (0-10 scale):  Minimal acceptable level (0-10 scale):     PAIN AD Score:     http://geriatrictoolkit.[place].[email or web address]/cog/painad.pdf (press ctrl +  left click to view)    Dyspnea:                           [ ]Mild [ ]Moderate [ ]Severe  Anxiety:                             [ ]Mild [ ]Moderate [ ]Severe  Fatigue:                             [ ]Mild [ ]Moderate [ ]Severe  Nausea:                             [ ]Mild [ ]Moderate [ ]Severe  Loss of appetite:              [ ]Mild [x ]Moderate [ ]Severe  Constipation:                    [ ]Mild [ ]Moderate [ ]Severe    Other Symptoms:  [ x]All other review of systems negative     Karnofsky Performance Score/Palliative Performance Status Version 2:         30%    http://npcrc.org/files/news/palliative_performance_scale_ppsv2.pdf  PHYSICAL EXAM:  Vital Signs Last 24 Hrs  T(C): 36.5 (16 May 2023 11:00), Max: 36.7 (15 May 2023 20:00)  T(F): 97.7 (16 May 2023 11:00), Max: 98 (15 May 2023 20:00)  HR: 87 (16 May 2023 12:00) (65 - 94)  BP: 150/84 (16 May 2023 12:00) (92/55 - 169/78)  BP(mean): 104 (16 May 2023 12:00) (66 - 130)  RR: 19 (16 May 2023 12:00) (13 - 27)  SpO2: 100% (16 May 2023 12:00) (95% - 100%)    Parameters below as of 15 May 2023 19:02  Patient On (Oxygen Delivery Method): room air     I&O's Summary    15 May 2023 07:01  -  16 May 2023 07:00  --------------------------------------------------------  IN: 900 mL / OUT: 1875 mL / NET: -975 mL    16 May 2023 07:01  -  16 May 2023 14:30  --------------------------------------------------------  IN: 200 mL / OUT: 150 mL / NET: 50 mL    GENERAL:  [x ]Alert  [ ]Oriented x   [ ]Lethargic  [ ]Cachexia  [ ]Unarousable  [x ]Verbal  [ ]Non-Verbal  Behavioral:   [ ] Anxiety  [ ] Delirium [ ] Agitation [ ] Other  HEENT:  [x ]Normal   [ ]Dry mouth   [ ]ET Tube/Trach  [ ]Oral lesions  PULMONARY:   [x ]Clear, right lower lobe diminished  [ ]Tachypnea  [ ]Audible excessive secretions   [ ]Rhonchi        [ ]Right [ ]Left [ ]Bilateral  [ x]Crackles        [ ]Right [ x]Left lower lobe [ ]Bilateral  [ ]Wheezing     [ ]Right [ ]Left [ ]Bilateral  CARDIOVASCULAR:    [ x]Regular [ ]Irregular [ ]Tachy  [ ]Selene [ ]Murmur [ ]Other  GASTROINTESTINAL:  [ x]Soft  [ ]Distended   [ ]+BS  [ ]Non tender [x ]Tender LLQ  [ ]PEG [ ]OGT/ NGT  Last BM: 23 GENITOURINARY:  [ ]Normal [ x] Incontinent   [ ]Oliguria/Anuria   [ ]Albarado  MUSCULOSKELETAL:   [ ]Normal   [ ]Weakness  [ x]Bed/Wheelchair bound [ ]Edema  NEUROLOGIC:   [ ]No focal deficits  [ ] Cognitive impairment  [ ] Dysphagia [ ]Dysarthria [ ] Paresis [ ]Other   SKIN:   [ ]Normal   [ ]Pressure ulcer(s)  [ ]Rash    CRITICAL CARE:  [ ] Shock Present  [ ]Septic [ ]Cardiogenic [ ]Neurologic [ ]Hypovolemic  [ ]  Vasopressors [ ]  Inotropes   [ ] Respiratory failure present [ ] mechanical ventilation [ ] non-invasive ventilatory support [ ] High flow  [ ] Acute  [ ] Chronic [ ] Hypoxic  [ ] Hypercarbic [ ] Other  [x ] Other organ failure     LABS:                        6.8    12.63 )-----------( 287      ( 16 May 2023 03:30 )             21.2   05-16    135  |  108  |  117<H>  ----------------------------<  85  4.6   |  19<L>  |  9.29<H>    Ca    8.3<L>      16 May 2023 03:30  Phos  7.3     05-16  Mg     2.6     05-16    TPro  6.1  /  Alb  2.6<L>  /  TBili  0.4  /  DBili  x   /  AST  7<L>  /  ALT  8<L>  /  AlkPhos  81  05-16  PT/INR - ( 15 May 2023 03:00 )   PT: 11.4 sec;   INR: 0.96 ratio         PTT - ( 15 May 2023 03:00 )  PTT:32.3 sec    Urinalysis Basic - ( 15 May 2023 11:05 )    Color: Yellow / Appearance: Clear / S.010 / pH: x  Gluc: x / Ketone: Negative  / Bili: Negative / Urobili: Negative mg/dL   Blood: x / Protein: 500 mg/dL / Nitrite: Negative   Leuk Esterase: Small / RBC: 0-2 /HPF / WBC 6-10   Sq Epi: x / Non Sq Epi: x / Bacteria: Few    RADIOLOGY & ADDITIONAL STUDIES:   < from: CT Abdomen and Pelvis No Cont (23 @ 19:57) >  PROCEDURE:  CT of the Abdomen and Pelvis was performed.  Sagittal and coronal reformats were performed.  FINDINGS:  LOWER CHEST: Small bilateral effusions, mitral valve calcifications and   coronary artery calcifications. Incidentally noted lipoma in the left rib   cage musculature  LIVER: Within normal limits.  BILE DUCTS: Normal caliber.  GALLBLADDER: Cholecystectomy.  SPLEEN: Within normal limits.  PANCREAS: Atrophic  ADRENALS: Within normal limits.  KIDNEYS/URETERS: Left kidney is atrophic and contains a calcified cyst.   Right kidney is also atrophic and contains a simple appearing cyst.  BLADDER: Within normal limits.  REPRODUCTIVE ORGANS: Calcified fibroid uterus  BOWEL: No bowel obstruction is seen. A moderate amount of stool is seen   in the rectal vault. Oral contrast is seen retained in numerous colonic   diverticula without evidence of diverticulitis. Appendix is not   visualized. No evidence of inflammation in the pericecal region.  PERITONEUM: No ascites.  VESSELS: IVC filter seen in place. Atherosclerotic calcifications of the   aorta  RETROPERITONEUM/LYMPH NODES: No lymphadenopathy.  ABDOMINAL WALL: Mild edema in the subcutaneous soft tissues  BONES: Within normal limits.  IMPRESSION:  Moderate amount of stool in the rectal vault. No evidence of   inflammation. Small bilateral effusions. Other findings as above.  --- End of Report ---  < end of copied text >    < from: Xray Chest 1 View- PORTABLE-Urgent (Xray Chest 1 View- PORTABLE-Urgent .) (23 @ 17:46) >  Impression:  Small bilateral pleural effusions and lower lobe atelectasis.  Cardiomegaly with coronary artery calcifications.  --- End of Report ---  < end of copied text >    PROTEIN CALORIE MALNUTRITION PRESENT: [x ] Yes [ ] No  [ x] PPSV2 < or = to 30% [ ] significant weight loss  [x ] poor nutritional intake [ ] catabolic state [ ] anasarca     Artificial Nutrition [ ]     REFERRALS:   [ ]Chaplaincy  [ ] Hospice  [ ]Child Life  [ ]Social Work  [ ]Case management [ ]Holistic Therapy     Goals of Care Document:

## 2023-05-16 NOTE — PROGRESS NOTE ADULT - SUBJECTIVE AND OBJECTIVE BOX
COVERING RESIDENT: Adolfo Cain (PGY-1)    INTERVAL HPI/OVERNIGHT EVENTS: No acute overnight events.    SUBJECTIVE: Patient seen and examined at bedside. No complaints. Denies fever, chills, chest pain, shortness of breath, abdominal pain, nausea, vomiting, changes in bowel habits, or urinary symptoms    OBJECTIVE:    VITAL SIGNS:  ICU Vital Signs Last 24 Hrs  T(C): 36.2 (16 May 2023 05:00), Max: 36.7 (15 May 2023 20:00)  T(F): 97.1 (16 May 2023 05:00), Max: 98 (15 May 2023 20:00)  HR: 94 (16 May 2023 08:00) (61 - 94)  BP: 131/67 (16 May 2023 08:00) (92/55 - 177/86)  BP(mean): 84 (16 May 2023 08:00) (66 - 113)  ABP: --  ABP(mean): --  RR: 16 (16 May 2023 08:00) (13 - 27)  SpO2: 98% (16 May 2023 08:00) (91% - 100%)    O2 Parameters below as of 15 May 2023 19:02  Patient On (Oxygen Delivery Method): room air              05-15 @ 07:01  -  05-16 @ 07:00  --------------------------------------------------------  IN: 900 mL / OUT: 1875 mL / NET: -975 mL      CAPILLARY BLOOD GLUCOSE          PHYSICAL EXAM:    General: NAD  HEENT: NC/AT; PERRL, clear conjunctiva  Respiratory: CTA b/l  Cardiovascular: +S1/S2; RRR  Abdomen: soft, NT/ND; +BS x4  Extremities: WWP, 2+ peripheral pulses b/l; no LE edema  Skin: normal color and turgor; no rash  Neurological: AAOx3, no focal deficits    MEDICATIONS:  MEDICATIONS  (STANDING):  amLODIPine   Tablet 5 milliGRAM(s) Oral daily  atorvastatin 20 milliGRAM(s) Oral at bedtime  calcium acetate 667 milliGRAM(s) Oral three times a day with meals  chlorhexidine 2% Cloths 1 Application(s) Topical <User Schedule>  DULoxetine 30 milliGRAM(s) Oral daily  ferrous    sulfate 325 milliGRAM(s) Oral daily  folic acid 1 milliGRAM(s) Oral daily  heparin   Injectable 5000 Unit(s) SubCutaneous every 8 hours  pantoprazole    Tablet 40 milliGRAM(s) Oral every 12 hours  polyethylene glycol 3350 17 Gram(s) Oral daily  senna 2 Tablet(s) Oral at bedtime  sodium bicarbonate 1300 milliGRAM(s) Oral three times a day  sodium zirconium cyclosilicate 10 Gram(s) Oral every 8 hours    MEDICATIONS  (PRN):  benzonatate 100 milliGRAM(s) Oral every 8 hours PRN Cough      ALLERGIES:  Allergies    No Known Allergies    Intolerances        LABS:                        6.8    12.63 )-----------( 287      ( 16 May 2023 03:30 )             21.2     05-16    135  |  108  |  117<H>  ----------------------------<  85  4.6   |  19<L>  |  9.29<H>    Ca    8.3<L>      16 May 2023 03:30  Phos  7.3     05-16  Mg     2.6     05-16    TPro  6.1  /  Alb  2.6<L>  /  TBili  0.4  /  DBili  x   /  AST  7<L>  /  ALT  8<L>  /  AlkPhos  81  05-16    PT/INR - ( 15 May 2023 03:00 )   PT: 11.4 sec;   INR: 0.96 ratio         PTT - ( 15 May 2023 03:00 )  PTT:32.3 sec  Urinalysis Basic - ( 15 May 2023 11:05 )    Color: Yellow / Appearance: Clear / S.010 / pH: x  Gluc: x / Ketone: Negative  / Bili: Negative / Urobili: Negative mg/dL   Blood: x / Protein: 500 mg/dL / Nitrite: Negative   Leuk Esterase: Small / RBC: 0-2 /HPF / WBC 6-10   Sq Epi: x / Non Sq Epi: x / Bacteria: Few        RADIOLOGY & ADDITIONAL TESTS: Reviewed. COVERING RESIDENT: Adolfo Cain (PGY-3)    INTERVAL HPI/OVERNIGHT EVENTS: Hgb trending down to 6.8. Holding off on prbc's. Plan to discuss w/ nephro given anemia of chronic/renal disease.    SUBJECTIVE: Patient seen and examined at bedside. No complaints beyond mild discomfort 2/2 to marcano. Denies significant abd pain. States she is hungry. SOB improved.     OBJECTIVE:    VITAL SIGNS:  ICU Vital Signs Last 24 Hrs  T(C): 36.2 (16 May 2023 05:00), Max: 36.7 (15 May 2023 20:00)  T(F): 97.1 (16 May 2023 05:00), Max: 98 (15 May 2023 20:00)  HR: 94 (16 May 2023 08:00) (61 - 94)  BP: 131/67 (16 May 2023 08:00) (92/55 - 177/86)  BP(mean): 84 (16 May 2023 08:00) (66 - 113)  ABP: --  ABP(mean): --  RR: 16 (16 May 2023 08:00) (13 - 27)  SpO2: 98% (16 May 2023 08:00) (91% - 100%)    O2 Parameters below as of 15 May 2023 19:02  Patient On (Oxygen Delivery Method): room air    05-15 @ 07:01  -  -16 @ 07:00  --------------------------------------------------------  IN: 900 mL / OUT: 1875 mL / NET: -975 mL    CAPILLARY BLOOD GLUCOSE    PHYSICAL EXAM:    General: NAD  HEENT: NC/AT; PERRL, clear conjunctiva  Respiratory: CTA b/l  Cardiovascular: +S1/S2; RRR  Abdomen: soft, NT/ND; +BS x4. Marcano in place.   Extremities: WWP, 2+ peripheral pulses b/l; no LE edema  Skin: normal color and turgor; no rash  Neurological: AAOx3, no focal deficits    MEDICATIONS:  MEDICATIONS  (STANDING):  amLODIPine   Tablet 5 milliGRAM(s) Oral daily  atorvastatin 20 milliGRAM(s) Oral at bedtime  calcium acetate 667 milliGRAM(s) Oral three times a day with meals  chlorhexidine 2% Cloths 1 Application(s) Topical <User Schedule>  DULoxetine 30 milliGRAM(s) Oral daily  ferrous    sulfate 325 milliGRAM(s) Oral daily  folic acid 1 milliGRAM(s) Oral daily  heparin   Injectable 5000 Unit(s) SubCutaneous every 8 hours  pantoprazole    Tablet 40 milliGRAM(s) Oral every 12 hours  polyethylene glycol 3350 17 Gram(s) Oral daily  senna 2 Tablet(s) Oral at bedtime  sodium bicarbonate 1300 milliGRAM(s) Oral three times a day  sodium zirconium cyclosilicate 10 Gram(s) Oral every 8 hours    MEDICATIONS  (PRN):  benzonatate 100 milliGRAM(s) Oral every 8 hours PRN Cough    ALLERGIES:  Allergies    No Known Allergies    Intolerances    LABS:                        6.8    12.63 )-----------( 287      ( 16 May 2023 03:30 )             21.2     05-16    135  |  108  |  117<H>  ----------------------------<  85  4.6   |  19<L>  |  9.29<H>    Ca    8.3<L>      16 May 2023 03:30  Phos  7.3     05-16  Mg     2.6     05-16    TPro  6.1  /  Alb  2.6<L>  /  TBili  0.4  /  DBili  x   /  AST  7<L>  /  ALT  8<L>  /  AlkPhos  81  05-16    PT/INR - ( 15 May 2023 03:00 )   PT: 11.4 sec;   INR: 0.96 ratio         PTT - ( 15 May 2023 03:00 )  PTT:32.3 sec  Urinalysis Basic - ( 15 May 2023 11:05 )    Color: Yellow / Appearance: Clear / S.010 / pH: x  Gluc: x / Ketone: Negative  / Bili: Negative / Urobili: Negative mg/dL   Blood: x / Protein: 500 mg/dL / Nitrite: Negative   Leuk Esterase: Small / RBC: 0-2 /HPF / WBC 6-10   Sq Epi: x / Non Sq Epi: x / Bacteria: Few        RADIOLOGY & ADDITIONAL TESTS: Reviewed.

## 2023-05-16 NOTE — CONSULT NOTE ADULT - PROBLEM SELECTOR RECOMMENDATION 9
intermittent bilateral lower leg pain, burning in nature  likely neuropathic component, would defer gabapentin given renal failure at this time and favor low dose opioid, oxycodone 2.5mg q 4-6 hours PRN for pain as it would also help with neuropathic pain LLQ abdominal pain, tender to palpation denies nausea, has documentation with stool output noted yesterday and today

## 2023-05-16 NOTE — PROGRESS NOTE ADULT - SUBJECTIVE AND OBJECTIVE BOX
Adonis Mata MD  Nephrology  [phone]    BREA ALEJANDRE  82y  Patient is a 82y old  Female who presents with a chief complaint of bradycardia and anemia (16 May 2023 14:29)    HPI:  Seen and examined at bedside. She has agreed for trial of HD again and will decide if she wants to continue  She feels better today.    HEALTH ISSUES - PROBLEM Dx:  Bilateral leg pain    Acute renal failure    Decreased appetite    Functional quadriplegia    Encounter for palliative care    Abdominal pain          MEDICATIONS  (STANDING):  amLODIPine   Tablet 5 milliGRAM(s) Oral daily  atorvastatin 20 milliGRAM(s) Oral at bedtime  calcium acetate 667 milliGRAM(s) Oral three times a day with meals  chlorhexidine 2% Cloths 1 Application(s) Topical <User Schedule>  DULoxetine 30 milliGRAM(s) Oral daily  ferrous    sulfate 325 milliGRAM(s) Oral daily  folic acid 1 milliGRAM(s) Oral daily  heparin   Injectable 5000 Unit(s) SubCutaneous every 8 hours  pantoprazole    Tablet 40 milliGRAM(s) Oral every 12 hours  polyethylene glycol 3350 17 Gram(s) Oral daily  senna 2 Tablet(s) Oral at bedtime  sodium bicarbonate 1300 milliGRAM(s) Oral three times a day  sodium zirconium cyclosilicate 10 Gram(s) Oral every 8 hours    MEDICATIONS  (PRN):  acetaminophen     Tablet .. 650 milliGRAM(s) Oral every 6 hours PRN Mild Pain (1 - 3), Moderate Pain (4 - 6)  benzonatate 100 milliGRAM(s) Oral every 8 hours PRN Cough  oxyCODONE    IR 2.5 milliGRAM(s) Oral every 6 hours PRN Severe Pain (7 - 10)    Vital Signs Last 24 Hrs  T(C): 36.4 (16 May 2023 15:30), Max: 36.7 (15 May 2023 20:00)  T(F): 97.5 (16 May 2023 15:30), Max: 98 (15 May 2023 20:00)  HR: 91 (16 May 2023 18:00) (67 - 97)  BP: 155/90 (16 May 2023 18:00) (92/55 - 165/85)  BP(mean): 108 (16 May 2023 18:00) (66 - 130)  RR: 23 (16 May 2023 18:00) (13 - 28)  SpO2: 97% (16 May 2023 18:00) (95% - 100%)    Parameters below as of 15 May 2023 19:02  Patient On (Oxygen Delivery Method): room air      Daily Height in cm: 166.37 (16 May 2023 12:17)    Daily Weight in k.2 (16 May 2023 05:00)    PHYSICAL EXAM:  Constitutional:  She appears comfortable and not distressed. Not diaphoretic.    Neck:  The thyroid is normal. Trachea is midline.     Breasts: Normal examination.    Respiratory: The lungs are clear to auscultation. No dullness and expansion is normal.    Cardiovascular: S1 and S2 are normal. No mummurs, rubs or gallops are present.    Gastrointestinal: The abdomen is soft. No tenderness is present. No masses are present. Bowel sounds are normal.    Genitourinary: The bladder is not distended. No CVA tenderness is present.    Extremities: No edema is noted. No deformities are present.    Neurological: Cognition is normal. Tone, power and sensation are normal. Gait is steady.    Skin: No lesions are seen  or palpated.    Lymph Nodes: No lymphadenopathy is present.    Psychiatric: Mood is appropriate. No hallucinations or flight of ideas are noted.                              6.8    12.63 )-----------( 287      ( 16 May 2023 03:30 )             21.2     05-16    135  |  108  |  117<H>  ----------------------------<  85  4.6   |  19<L>  |  9.29<H>    Ca    8.3<L>      16 May 2023 03:30  Phos  7.3     -  Mg     2.6     -16    TPro  6.1  /  Alb  2.6<L>  /  TBili  0.4  /  DBili  x   /  AST  7<L>  /  ALT  8<L>  /  AlkPhos  81  05-16    Urinalysis Basic - ( 15 May 2023 11:05 )    Color: Yellow / Appearance: Clear / S.010 / pH: x  Gluc: x / Ketone: Negative  / Bili: Negative / Urobili: Negative mg/dL   Blood: x / Protein: 500 mg/dL / Nitrite: Negative   Leuk Esterase: Small / RBC: 0-2 /HPF / WBC 6-10   Sq Epi: x / Non Sq Epi: x / Bacteria: Few

## 2023-05-16 NOTE — DIETITIAN INITIAL EVALUATION ADULT - NS FNS WEIGHT CHANGE REASON
As per pt., her usual wt. is around 160 LBS(72.7 kg).  As per adm wt. of 91.3 kg/201.2 LBS, wt. gain of 18.6 kg/~ 41 LBS noted./unintentional

## 2023-05-16 NOTE — PHARMACOTHERAPY INTERVENTION NOTE - COMMENTS
Per policy, pantoprazole 40 mg IVP twice daily transitioned to oral formulation since the patient is tolerating feeding and/or other medications enterally. 
Home prescription medication list obtained from patient and/or outpatient pharmacy. Medications updated in patient's OMR for reconciliation.

## 2023-05-17 LAB
A1C WITH ESTIMATED AVERAGE GLUCOSE RESULT: 4.8 % — SIGNIFICANT CHANGE UP (ref 4–5.6)
ALBUMIN SERPL ELPH-MCNC: 2.6 G/DL — LOW (ref 3.3–5)
ALP SERPL-CCNC: 76 U/L — SIGNIFICANT CHANGE UP (ref 40–120)
ALT FLD-CCNC: 9 U/L — LOW (ref 12–78)
ANION GAP SERPL CALC-SCNC: 8 MMOL/L — SIGNIFICANT CHANGE UP (ref 5–17)
ANISOCYTOSIS BLD QL: SIGNIFICANT CHANGE UP
AST SERPL-CCNC: 10 U/L — LOW (ref 15–37)
BASOPHILS # BLD AUTO: 0.01 K/UL — SIGNIFICANT CHANGE UP (ref 0–0.2)
BASOPHILS NFR BLD AUTO: 0.1 % — SIGNIFICANT CHANGE UP (ref 0–2)
BILIRUB SERPL-MCNC: 0.3 MG/DL — SIGNIFICANT CHANGE UP (ref 0.2–1.2)
BUN SERPL-MCNC: 111 MG/DL — HIGH (ref 7–23)
BURR CELLS BLD QL SMEAR: PRESENT — SIGNIFICANT CHANGE UP
CALCIUM SERPL-MCNC: 8.3 MG/DL — LOW (ref 8.5–10.1)
CHLORIDE SERPL-SCNC: 106 MMOL/L — SIGNIFICANT CHANGE UP (ref 96–108)
CO2 SERPL-SCNC: 20 MMOL/L — LOW (ref 22–31)
CREAT SERPL-MCNC: 8.97 MG/DL — HIGH (ref 0.5–1.3)
EGFR: 4 ML/MIN/1.73M2 — LOW
EOSINOPHIL # BLD AUTO: 0.09 K/UL — SIGNIFICANT CHANGE UP (ref 0–0.5)
EOSINOPHIL NFR BLD AUTO: 0.9 % — SIGNIFICANT CHANGE UP (ref 0–6)
ESTIMATED AVERAGE GLUCOSE: 91 MG/DL — SIGNIFICANT CHANGE UP (ref 68–114)
GLUCOSE SERPL-MCNC: 85 MG/DL — SIGNIFICANT CHANGE UP (ref 70–99)
HCT VFR BLD CALC: 23.1 % — LOW (ref 34.5–45)
HGB BLD-MCNC: 7.3 G/DL — LOW (ref 11.5–15.5)
HYPOCHROMIA BLD QL: SIGNIFICANT CHANGE UP
IMM GRANULOCYTES NFR BLD AUTO: 0.5 % — SIGNIFICANT CHANGE UP (ref 0–0.9)
LYMPHOCYTES # BLD AUTO: 1.84 K/UL — SIGNIFICANT CHANGE UP (ref 1–3.3)
LYMPHOCYTES # BLD AUTO: 17.6 % — SIGNIFICANT CHANGE UP (ref 13–44)
MAGNESIUM SERPL-MCNC: 2.5 MG/DL — SIGNIFICANT CHANGE UP (ref 1.6–2.6)
MANUAL SMEAR VERIFICATION: SIGNIFICANT CHANGE UP
MCHC RBC-ENTMCNC: 26.4 PG — LOW (ref 27–34)
MCHC RBC-ENTMCNC: 31.6 G/DL — LOW (ref 32–36)
MCV RBC AUTO: 83.7 FL — SIGNIFICANT CHANGE UP (ref 80–100)
MONOCYTES # BLD AUTO: 0.55 K/UL — SIGNIFICANT CHANGE UP (ref 0–0.9)
MONOCYTES NFR BLD AUTO: 5.3 % — SIGNIFICANT CHANGE UP (ref 2–14)
NEUTROPHILS # BLD AUTO: 7.89 K/UL — HIGH (ref 1.8–7.4)
NEUTROPHILS NFR BLD AUTO: 75.6 % — SIGNIFICANT CHANGE UP (ref 43–77)
NRBC # BLD: 0 /100 WBCS — SIGNIFICANT CHANGE UP (ref 0–0)
OVALOCYTES BLD QL SMEAR: SLIGHT — SIGNIFICANT CHANGE UP
PHOSPHATE SERPL-MCNC: 7.1 MG/DL — HIGH (ref 2.5–4.5)
PLAT MORPH BLD: NORMAL — SIGNIFICANT CHANGE UP
PLATELET # BLD AUTO: 278 K/UL — SIGNIFICANT CHANGE UP (ref 150–400)
POIKILOCYTOSIS BLD QL AUTO: SIGNIFICANT CHANGE UP
POTASSIUM SERPL-MCNC: 4 MMOL/L — SIGNIFICANT CHANGE UP (ref 3.5–5.3)
POTASSIUM SERPL-SCNC: 4 MMOL/L — SIGNIFICANT CHANGE UP (ref 3.5–5.3)
PROT SERPL-MCNC: 6.4 GM/DL — SIGNIFICANT CHANGE UP (ref 6–8.3)
RBC # BLD: 2.76 M/UL — LOW (ref 3.8–5.2)
RBC # FLD: 22.3 % — HIGH (ref 10.3–14.5)
RBC BLD AUTO: ABNORMAL
SODIUM SERPL-SCNC: 134 MMOL/L — LOW (ref 135–145)
WBC # BLD: 10.43 K/UL — SIGNIFICANT CHANGE UP (ref 3.8–10.5)
WBC # FLD AUTO: 10.43 K/UL — SIGNIFICANT CHANGE UP (ref 3.8–10.5)

## 2023-05-17 PROCEDURE — 99233 SBSQ HOSP IP/OBS HIGH 50: CPT

## 2023-05-17 PROCEDURE — 99232 SBSQ HOSP IP/OBS MODERATE 35: CPT

## 2023-05-17 PROCEDURE — 99497 ADVNCD CARE PLAN 30 MIN: CPT

## 2023-05-17 PROCEDURE — 71045 X-RAY EXAM CHEST 1 VIEW: CPT | Mod: 26

## 2023-05-17 RX ORDER — CHLORHEXIDINE GLUCONATE 213 G/1000ML
1 SOLUTION TOPICAL
Refills: 0 | Status: DISCONTINUED | OUTPATIENT
Start: 2023-05-17 | End: 2023-05-17

## 2023-05-17 RX ORDER — SODIUM CHLORIDE 9 MG/ML
10 INJECTION INTRAMUSCULAR; INTRAVENOUS; SUBCUTANEOUS
Refills: 0 | Status: DISCONTINUED | OUTPATIENT
Start: 2023-05-17 | End: 2023-05-23

## 2023-05-17 RX ORDER — MUPIROCIN 20 MG/G
1 OINTMENT TOPICAL
Refills: 0 | Status: DISCONTINUED | OUTPATIENT
Start: 2023-05-17 | End: 2023-05-23

## 2023-05-17 RX ADMIN — AMLODIPINE BESYLATE 5 MILLIGRAM(S): 2.5 TABLET ORAL at 05:06

## 2023-05-17 RX ADMIN — PANTOPRAZOLE SODIUM 40 MILLIGRAM(S): 20 TABLET, DELAYED RELEASE ORAL at 17:37

## 2023-05-17 RX ADMIN — Medication 667 MILLIGRAM(S): at 12:03

## 2023-05-17 RX ADMIN — Medication 1300 MILLIGRAM(S): at 13:37

## 2023-05-17 RX ADMIN — POLYETHYLENE GLYCOL 3350 17 GRAM(S): 17 POWDER, FOR SOLUTION ORAL at 12:03

## 2023-05-17 RX ADMIN — HEPARIN SODIUM 5000 UNIT(S): 5000 INJECTION INTRAVENOUS; SUBCUTANEOUS at 21:21

## 2023-05-17 RX ADMIN — Medication 1300 MILLIGRAM(S): at 05:06

## 2023-05-17 RX ADMIN — SODIUM ZIRCONIUM CYCLOSILICATE 10 GRAM(S): 10 POWDER, FOR SUSPENSION ORAL at 05:06

## 2023-05-17 RX ADMIN — HEPARIN SODIUM 5000 UNIT(S): 5000 INJECTION INTRAVENOUS; SUBCUTANEOUS at 13:37

## 2023-05-17 RX ADMIN — Medication 667 MILLIGRAM(S): at 17:37

## 2023-05-17 RX ADMIN — HEPARIN SODIUM 5000 UNIT(S): 5000 INJECTION INTRAVENOUS; SUBCUTANEOUS at 05:06

## 2023-05-17 RX ADMIN — ATORVASTATIN CALCIUM 20 MILLIGRAM(S): 80 TABLET, FILM COATED ORAL at 21:20

## 2023-05-17 RX ADMIN — SENNA PLUS 2 TABLET(S): 8.6 TABLET ORAL at 21:20

## 2023-05-17 RX ADMIN — Medication 1300 MILLIGRAM(S): at 21:19

## 2023-05-17 RX ADMIN — Medication 1 MILLIGRAM(S): at 12:03

## 2023-05-17 RX ADMIN — CHLORHEXIDINE GLUCONATE 1 APPLICATION(S): 213 SOLUTION TOPICAL at 02:06

## 2023-05-17 RX ADMIN — DULOXETINE HYDROCHLORIDE 30 MILLIGRAM(S): 30 CAPSULE, DELAYED RELEASE ORAL at 12:02

## 2023-05-17 RX ADMIN — PANTOPRAZOLE SODIUM 40 MILLIGRAM(S): 20 TABLET, DELAYED RELEASE ORAL at 05:06

## 2023-05-17 RX ADMIN — Medication 325 MILLIGRAM(S): at 12:02

## 2023-05-17 RX ADMIN — Medication 667 MILLIGRAM(S): at 09:15

## 2023-05-17 RX ADMIN — MUPIROCIN 1 APPLICATION(S): 20 OINTMENT TOPICAL at 17:37

## 2023-05-17 NOTE — PROVIDER CONTACT NOTE (EICU) - ACTION/TREATMENT ORDERED:
E-alerted by bedside provider, requesting CXR for line placement, order placed as requested, results to be followed up by bedside provider.
I have not seen the pt yet while in ER- she will be seen upon arrival in ICU  Plan discussed with ICU PA Seth Kaba

## 2023-05-17 NOTE — PROGRESS NOTE ADULT - SUBJECTIVE AND OBJECTIVE BOX
HPI:  Pt is an 81 yo BF with h/o CKD stage 4 (has had HD) and CVA presented  to the ER 2 to anemia and abd pain.  Pt found to have a Hb 6.2 and was bradycardic but hemodynamically stable. ICU dx: 1) junctional rhythm (bradycardia) 2) Severe anemia, s/p 1 unit PRBC    ## Labs:  CBC:                        7.3    10.43 )-----------( 278      ( 17 May 2023 03:05 )             23.1     Chem:      134<L>  |  106  |  111<H>  ----------------------------<  85  4.0   |  20<L>  |  8.97<H>    Ca    8.3<L>      17 May 2023 03:05  Phos  7.1       Mg     2.5         TPro  6.4  /  Alb  2.6<L>  /  TBili  0.3  /  DBili  x   /  AST  10<L>  /  ALT  9<L>  /  AlkPhos  76      Coags:          ## Imaging:    ## Medications:    amLODIPine   Tablet 5 milliGRAM(s) Oral daily    benzonatate 100 milliGRAM(s) Oral every 8 hours PRN    atorvastatin 20 milliGRAM(s) Oral at bedtime    heparin   Injectable 5000 Unit(s) SubCutaneous every 8 hours    pantoprazole    Tablet 40 milliGRAM(s) Oral every 12 hours  polyethylene glycol 3350 17 Gram(s) Oral daily  senna 2 Tablet(s) Oral at bedtime    acetaminophen     Tablet .. 650 milliGRAM(s) Oral every 6 hours PRN  DULoxetine 30 milliGRAM(s) Oral daily  oxyCODONE    IR 2.5 milliGRAM(s) Oral every 6 hours PRN      ## Vitals:  T(C): 36.4 (23 @ 09:00), Max: 36.5 (23 @ 11:00)  HR: 80 (23 @ 10:00) (70 - 100)  BP: 139/101 (23 @ 10:00) (132/101 - 178/97)  BP(mean): 109 (23 @ 10:00) (84 - 121)  RR: 18 (23 @ 10:00) (13 - 29)  SpO2: 100% (23 @ 10:00) (95% - 100%)  Wt(kg): --  Vent:   AB-16 @ 07:01  -   @ 07:00  --------------------------------------------------------  IN: 900 mL / OUT: 850 mL / NET: 50 mL     @ 07:01  -   @ 10:50  --------------------------------------------------------  IN: 150 mL / OUT: 0 mL / NET: 150 mL          ## P/E:  Gen: lying comfortably in bed in no apparent distress  Neck: R IJ HD cath  Lungs: CTA  Heart: RRR  Abd: Soft/+BS/ Non-tender  Ext: No sign edema  Neuro: AAO x3    CENTRAL LINE: [x ] YES [ ] NO  LOCATION: R IJ HD cath  DATE INSERTED: 2023  REMOVE: [ ] YES [ ] NO      MARY: [ ] YES [ ] NO    DATE INSERTED:  REMOVE:  [ ] YES [ ] NO      A-LINE:  [ ] YES [ ] NO  LOCATION:   DATE INSERTED:  REMOVE:  [ ] YES [ ] NO  EXPLAIN:    CODE STATUS: [x ] full code  [ ] DNR  [ ] DNI  [ ] MOLST  Goals of care discussion: [ ] yes

## 2023-05-17 NOTE — PROGRESS NOTE ADULT - PROBLEM SELECTOR PLAN 2
catheter placed for HD, plan for HD today and will reassess patient's response.  Patient aware HD would be 3 days a week for longer term maintenance  CKD history, follows with Dr. Sandhu, nephrology outpatient, Dr. Mata has been following her case during hospitalization  creatinine stable, plan to trial HD, patient will reevaluate how she tolerates HD  anemia, likely secondary to CKD, transfuse PRN.  monitor lytes

## 2023-05-17 NOTE — PROGRESS NOTE ADULT - PROBLEM SELECTOR PLAN 5
See GOC above.  Plan for initiation of HD today.  Patient and son to continue GOC conversations.  Palliative to follow.    Discussed with EDDIE Kaba

## 2023-05-17 NOTE — PROGRESS NOTE ADULT - PROBLEM SELECTOR PLAN 3
reports feeling decreased po intake for some time-greater than weeks  offer small more frequent meals, dietician consult appreciated.

## 2023-05-17 NOTE — PROGRESS NOTE ADULT - PROBLEM SELECTOR PLAN 1
no pain reported at this time, has been intermittent, 2 doses of oxycodone in the last 24 hours taken  elevate legs no pain reported at this time, has been intermittent, 2 doses of oxycodone in the last 24 hours taken  heel protectors

## 2023-05-17 NOTE — CHART NOTE - NSCHARTNOTEFT_GEN_A_CORE
Chart reviewed and events to date noted. Patient had been evaluated by PT for woundcare on 5/15 but also established that patient is normally bedbound with hospital bed, patient mechanical  (Arjo) lift and wheelchair c home health aide services. She offered she has not ambulated in 3 years. Restorative, skilled PT not indicated but will continue to be monitored by Wound care PT to ensure integumentary integrity. Discharged recommendation, when medically stable, is for home c home care/wound care RN.

## 2023-05-17 NOTE — PROGRESS NOTE ADULT - SUBJECTIVE AND OBJECTIVE BOX
SUBJECTIVE AND OBJECTIVE: Patient lethargic, resting in bed  INTERVAL HPI/OVERNIGHT EVENTS:    DNR on chart: no  Allergies    No Known Allergies    Intolerances    MEDICATIONS  (STANDING):  amLODIPine   Tablet 5 milliGRAM(s) Oral daily  atorvastatin 20 milliGRAM(s) Oral at bedtime  calcium acetate 667 milliGRAM(s) Oral three times a day with meals  chlorhexidine 2% Cloths 1 Application(s) Topical <User Schedule>  DULoxetine 30 milliGRAM(s) Oral daily  ferrous    sulfate 325 milliGRAM(s) Oral daily  folic acid 1 milliGRAM(s) Oral daily  heparin   Injectable 5000 Unit(s) SubCutaneous every 8 hours  mupirocin 2% Nasal 1 Application(s) Both Nostrils two times a day  pantoprazole    Tablet 40 milliGRAM(s) Oral every 12 hours  polyethylene glycol 3350 17 Gram(s) Oral daily  senna 2 Tablet(s) Oral at bedtime  sodium bicarbonate 1300 milliGRAM(s) Oral three times a day    MEDICATIONS  (PRN):  acetaminophen     Tablet .. 650 milliGRAM(s) Oral every 6 hours PRN Mild Pain (1 - 3), Moderate Pain (4 - 6)  benzonatate 100 milliGRAM(s) Oral every 8 hours PRN Cough  oxyCODONE    IR 2.5 milliGRAM(s) Oral every 6 hours PRN Severe Pain (7 - 10)  sodium chloride 0.9% lock flush 10 milliLiter(s) IV Push every 1 hour PRN Pre/post blood products, medications, blood draw, and to maintain line patency      ITEMS UNCHECKED ARE NOT PRESENT    PRESENT SYMPTOMS: [ ]Unable to obtain due to poor mentation   Source if other than patient:  [ ]Family   [ ]Team     Pain:  [ ]yes [ x]no  QOL impact -   Location -                    Aggravating factors -  Quality -  Radiation -  Timing-  Severity (0-10 scale):  Minimal acceptable level (0-10 scale):     Dyspnea:                           [ ]Mild [ ]Moderate [ ]Severe  Anxiety:                             [ ]Mild [ ]Moderate [ ]Severe  Fatigue:                             [ ]Mild [x ]Moderate [ ]Severe  Nausea:                             [ ]Mild [ ]Moderate [ ]Severe  Loss of appetite:              [ ]Mild [ x]Moderate [ ]Severe  Constipation:                    [ ]Mild [ ]Moderate [ ]Severe    PAIN AD Score:	  [email or web address] (Ctrl + left click to view)    Other Symptoms:  [x ]All other review of systems negative     Palliative Performance Status Version 2:        30 %      [email or web address]/files/[email or web address]_[email or web address]  PHYSICAL EXAM:  Vital Signs Last 24 Hrs  T(C): 36.1 (17 May 2023 12:00), Max: 36.4 (16 May 2023 23:15)  T(F): 97 (17 May 2023 12:00), Max: 97.6 (16 May 2023 23:15)  HR: 71 (17 May 2023 15:00) (64 - 103)  BP: 148/78 (17 May 2023 15:00) (126/82 - 178/97)  BP(mean): 100 (17 May 2023 15:00) (84 - 121)  RR: 15 (17 May 2023 15:00) (13 - 29)  SpO2: 99% (17 May 2023 15:00) (95% - 100%)    Parameters below as of 17 May 2023 07:00  Patient On (Oxygen Delivery Method): room air     I&O's Summary    16 May 2023 07:01  -  17 May 2023 07:00  --------------------------------------------------------  IN: 900 mL / OUT: 850 mL / NET: 50 mL    17 May 2023 07:01  -  17 May 2023 15:32  --------------------------------------------------------  IN: 440 mL / OUT: 200 mL / NET: 240 mL       GENERAL:  [ ]Alert  [x ]Oriented x 3  [x ]Lethargic  [ ]Cachexia  [ ]Unarousable  [ x]Verbal  [ ]Non-Verbal  Behavioral:   [ ]Anxiety  [ ]Delirium [ ]Agitation [ ]Other  HEENT:  [ ]Normal   [ ]Dry mouth   [ ]ET Tube/Trach  [ ]Oral lesions  PULMONARY:   [ x]Clear [ ]Tachypnea  [ ]Audible excessive secretions   [ ]Rhonchi        [ ]Right [ ]Left [ ]Bilateral  [ ]Crackles        [ ]Right [ ]Left [ ]Bilateral  [ ]Wheezing     [ ]Right [ ]Left [ ]Bilateral  [ x]Diminished BS [ ] Right [ ]Left [x ]Bilateral bases  CARDIOVASCULAR:    [x ]Regular [ ]Irregular [ ]Tachy  [ ]Mark [ ]Murmur [ ]Other  GASTROINTESTINAL:  [ x]Soft  [ ]Distended   [ ]+BS  [x ]Non tender [ ]Tender  [ ]PEG [ ]OGT/ NGT   Last BM:  5/17/23  GENITOURINARY:  [ ]Normal [x ]Incontinent   [ ]Oliguria/Anuria   [ ]Albarado  MUSCULOSKELETAL:   [ ]Normal   [ ]Weakness  [ x]Bed/Wheelchair bound [ ]Edema  NEUROLOGIC:   [x ]No focal deficits  [ ] Cognitive impairment  [ ] Dysphagia [ ]Dysarthria [ ] Paresis [ ]Other   SKIN:   [ ]Normal  [ ]Rash   [ ]Pressure ulcer(s) [ ]y [ ]n present on admission    CRITICAL CARE:  [ ]Shock Present  [ ]Septic [ ]Cardiogenic [ ]Neurologic [ ]Hypovolemic  [ ]Vasopressors [ ]Inotropes  [ ]Respiratory failure present [ ]Mechanical Ventilation [ ]Non-invasive ventilatory support [ ]High-Flow  [ ]Acute  [ ]Chronic [ ]Hypoxic  [ ]Hypercarbic [ ]Other  [ ]Other organ failure     LABS:                        7.3    10.43 )-----------( 278      ( 17 May 2023 03:05 )             23.1   05-17    134<L>  |  106  |  111<H>  ----------------------------<  85  4.0   |  20<L>  |  8.97<H>    Ca    8.3<L>      17 May 2023 03:05  Phos  7.1     05-17  Mg     2.5     05-17    TPro  6.4  /  Alb  2.6<L>  /  TBili  0.3  /  DBili  x   /  AST  10<L>  /  ALT  9<L>  /  AlkPhos  76  05-17    RADIOLOGY & ADDITIONAL STUDIES:   < from: Xray Chest 1 View-PORTABLE IMMEDIATE (Xray Chest 1 View-PORTABLE IMMEDIATE .) (05.17.23 @ 06:32) >  IMPRESSION: Right IJ approach catheter in good position with no   pneumothorax seen. Clear lungs.  --- End of Report ---  < end of copied text >    Protein Calorie Malnutrition Present: [ ]mild [ ]moderate [ ]severe [ ]underweight [ ]morbid obesity  https://www.andeal.org/vault/2440/web/files/ONC/Table_Clinical%20Characteristics%20to%20Document%20Malnutrition-White%20JV%20et%20al%202012.pdf    Height (cm): 166.4 (05-16-23 @ 12:17)  Weight (kg): 91.3 (05-14-23 @ 20:30)  BMI (kg/m2): 33 (05-16-23 @ 12:17)    [ ]PPSV2 < or = 30%  [ ]significant weight loss [ ]poor nutritional intake [ ]anasarca    [ ]Artificial Nutrition    REFERRALS:   [ ]Chaplaincy  [ ]Hospice  [ ]Child Life  [ ]Social Work  [ ]Case management [ ]Holistic Therapy     Goals of Care Document:

## 2023-05-17 NOTE — PROGRESS NOTE ADULT - ASSESSMENT
Pt is an 81 yo BF with h/o CKD stage 4 (has had HD) and CVA presented  to the ER 2 to anemia and abd pain.  Pt found to have a Hb 6.2 and was bradycardic but hemodynamically stable. ICU dx: 1) junctional rhythm (bradycardia) 2) Severe anemia, s/p 1 unit PRBC    CVS: Pt no longer bradycardic/ Cont Norvasc (may need to increase dose or add another agent)  HEME: Severe anemia in part 2 to CKD; s/p 1 unit PRBC/ Check with Renal to give pt Epogen  FEN: Po renal diet  Renal: Pt has agreed for HD; HD today as per Renal  Social: If pt tolerates HD possible transfer to [facility] later today

## 2023-05-18 LAB
ANION GAP SERPL CALC-SCNC: 10 MMOL/L — SIGNIFICANT CHANGE UP (ref 5–17)
BASOPHILS # BLD AUTO: 0.01 K/UL — SIGNIFICANT CHANGE UP (ref 0–0.2)
BASOPHILS NFR BLD AUTO: 0.1 % — SIGNIFICANT CHANGE UP (ref 0–2)
BLD GP AB SCN SERPL QL: SIGNIFICANT CHANGE UP
BUN SERPL-MCNC: 79 MG/DL — HIGH (ref 7–23)
CALCIUM SERPL-MCNC: 8.2 MG/DL — LOW (ref 8.5–10.1)
CHLORIDE SERPL-SCNC: 105 MMOL/L — SIGNIFICANT CHANGE UP (ref 96–108)
CO2 SERPL-SCNC: 24 MMOL/L — SIGNIFICANT CHANGE UP (ref 22–31)
CREAT SERPL-MCNC: 6.53 MG/DL — HIGH (ref 0.5–1.3)
EGFR: 6 ML/MIN/1.73M2 — LOW
EOSINOPHIL # BLD AUTO: 0.08 K/UL — SIGNIFICANT CHANGE UP (ref 0–0.5)
EOSINOPHIL NFR BLD AUTO: 0.8 % — SIGNIFICANT CHANGE UP (ref 0–6)
GLUCOSE SERPL-MCNC: 88 MG/DL — SIGNIFICANT CHANGE UP (ref 70–99)
HBV CORE AB SER-ACNC: REACTIVE
HBV SURFACE AB SER-ACNC: SIGNIFICANT CHANGE UP
HBV SURFACE AG SER-ACNC: SIGNIFICANT CHANGE UP
HCT VFR BLD CALC: 21.3 % — LOW (ref 34.5–45)
HCT VFR BLD CALC: 22 % — LOW (ref 34.5–45)
HCV AB S/CO SERPL IA: 0.11 S/CO — SIGNIFICANT CHANGE UP (ref 0–0.99)
HCV AB SERPL-IMP: SIGNIFICANT CHANGE UP
HGB BLD-MCNC: 6.7 G/DL — CRITICAL LOW (ref 11.5–15.5)
HGB BLD-MCNC: 7 G/DL — CRITICAL LOW (ref 11.5–15.5)
IMM GRANULOCYTES NFR BLD AUTO: 0.4 % — SIGNIFICANT CHANGE UP (ref 0–0.9)
LYMPHOCYTES # BLD AUTO: 1.47 K/UL — SIGNIFICANT CHANGE UP (ref 1–3.3)
LYMPHOCYTES # BLD AUTO: 15.4 % — SIGNIFICANT CHANGE UP (ref 13–44)
MAGNESIUM SERPL-MCNC: 2.3 MG/DL — SIGNIFICANT CHANGE UP (ref 1.6–2.6)
MCHC RBC-ENTMCNC: 26.1 PG — LOW (ref 27–34)
MCHC RBC-ENTMCNC: 26.3 PG — LOW (ref 27–34)
MCHC RBC-ENTMCNC: 31.5 G/DL — LOW (ref 32–36)
MCHC RBC-ENTMCNC: 31.8 G/DL — LOW (ref 32–36)
MCV RBC AUTO: 82.1 FL — SIGNIFICANT CHANGE UP (ref 80–100)
MCV RBC AUTO: 83.5 FL — SIGNIFICANT CHANGE UP (ref 80–100)
MONOCYTES # BLD AUTO: 0.65 K/UL — SIGNIFICANT CHANGE UP (ref 0–0.9)
MONOCYTES NFR BLD AUTO: 6.8 % — SIGNIFICANT CHANGE UP (ref 2–14)
NEUTROPHILS # BLD AUTO: 7.28 K/UL — SIGNIFICANT CHANGE UP (ref 1.8–7.4)
NEUTROPHILS NFR BLD AUTO: 76.5 % — SIGNIFICANT CHANGE UP (ref 43–77)
NRBC # BLD: 0 /100 WBCS — SIGNIFICANT CHANGE UP (ref 0–0)
NRBC # BLD: 0 /100 WBCS — SIGNIFICANT CHANGE UP (ref 0–0)
PHOSPHATE SERPL-MCNC: 4.5 MG/DL — SIGNIFICANT CHANGE UP (ref 2.5–4.5)
PLATELET # BLD AUTO: 258 K/UL — SIGNIFICANT CHANGE UP (ref 150–400)
PLATELET # BLD AUTO: 280 K/UL — SIGNIFICANT CHANGE UP (ref 150–400)
POTASSIUM SERPL-MCNC: 3.5 MMOL/L — SIGNIFICANT CHANGE UP (ref 3.5–5.3)
POTASSIUM SERPL-SCNC: 3.5 MMOL/L — SIGNIFICANT CHANGE UP (ref 3.5–5.3)
RBC # BLD: 2.55 M/UL — LOW (ref 3.8–5.2)
RBC # BLD: 2.68 M/UL — LOW (ref 3.8–5.2)
RBC # FLD: 22.3 % — HIGH (ref 10.3–14.5)
RBC # FLD: 22.3 % — HIGH (ref 10.3–14.5)
SODIUM SERPL-SCNC: 139 MMOL/L — SIGNIFICANT CHANGE UP (ref 135–145)
WBC # BLD: 9.53 K/UL — SIGNIFICANT CHANGE UP (ref 3.8–10.5)
WBC # BLD: 9.92 K/UL — SIGNIFICANT CHANGE UP (ref 3.8–10.5)
WBC # FLD AUTO: 9.53 K/UL — SIGNIFICANT CHANGE UP (ref 3.8–10.5)
WBC # FLD AUTO: 9.92 K/UL — SIGNIFICANT CHANGE UP (ref 3.8–10.5)

## 2023-05-18 PROCEDURE — 99232 SBSQ HOSP IP/OBS MODERATE 35: CPT

## 2023-05-18 RX ORDER — ERYTHROPOIETIN 10000 [IU]/ML
10000 INJECTION, SOLUTION INTRAVENOUS; SUBCUTANEOUS
Refills: 0 | Status: DISCONTINUED | OUTPATIENT
Start: 2023-05-18 | End: 2023-05-20

## 2023-05-18 RX ORDER — PANTOPRAZOLE SODIUM 20 MG/1
40 TABLET, DELAYED RELEASE ORAL
Refills: 0 | Status: DISCONTINUED | OUTPATIENT
Start: 2023-05-18 | End: 2023-05-23

## 2023-05-18 RX ADMIN — Medication 1 MILLIGRAM(S): at 14:16

## 2023-05-18 RX ADMIN — CHLORHEXIDINE GLUCONATE 1 APPLICATION(S): 213 SOLUTION TOPICAL at 05:39

## 2023-05-18 RX ADMIN — Medication 1300 MILLIGRAM(S): at 05:39

## 2023-05-18 RX ADMIN — HEPARIN SODIUM 5000 UNIT(S): 5000 INJECTION INTRAVENOUS; SUBCUTANEOUS at 14:15

## 2023-05-18 RX ADMIN — Medication 325 MILLIGRAM(S): at 14:16

## 2023-05-18 RX ADMIN — Medication 1300 MILLIGRAM(S): at 21:49

## 2023-05-18 RX ADMIN — MUPIROCIN 1 APPLICATION(S): 20 OINTMENT TOPICAL at 18:36

## 2023-05-18 RX ADMIN — MUPIROCIN 1 APPLICATION(S): 20 OINTMENT TOPICAL at 05:38

## 2023-05-18 RX ADMIN — Medication 667 MILLIGRAM(S): at 18:36

## 2023-05-18 RX ADMIN — Medication 667 MILLIGRAM(S): at 14:15

## 2023-05-18 RX ADMIN — Medication 1300 MILLIGRAM(S): at 14:16

## 2023-05-18 RX ADMIN — AMLODIPINE BESYLATE 5 MILLIGRAM(S): 2.5 TABLET ORAL at 05:38

## 2023-05-18 RX ADMIN — DULOXETINE HYDROCHLORIDE 30 MILLIGRAM(S): 30 CAPSULE, DELAYED RELEASE ORAL at 16:07

## 2023-05-18 RX ADMIN — HEPARIN SODIUM 5000 UNIT(S): 5000 INJECTION INTRAVENOUS; SUBCUTANEOUS at 21:49

## 2023-05-18 RX ADMIN — ATORVASTATIN CALCIUM 20 MILLIGRAM(S): 80 TABLET, FILM COATED ORAL at 21:49

## 2023-05-18 RX ADMIN — HEPARIN SODIUM 5000 UNIT(S): 5000 INJECTION INTRAVENOUS; SUBCUTANEOUS at 05:39

## 2023-05-18 RX ADMIN — PANTOPRAZOLE SODIUM 40 MILLIGRAM(S): 20 TABLET, DELAYED RELEASE ORAL at 05:38

## 2023-05-18 RX ADMIN — ERYTHROPOIETIN 10000 UNIT(S): 10000 INJECTION, SOLUTION INTRAVENOUS; SUBCUTANEOUS at 21:50

## 2023-05-18 NOTE — PROGRESS NOTE ADULT - ATTENDING COMMENTS
Agree with above  82F PMH CKD IV, H/O CVA a/w R paresis now with abdominal pain and dyspnea  Found to be uremic (117) and bradycardic  Now s/p trial of HD x 1 with improvement in symptoms.  Scheduled for HD again today. Will give 1U pRBCs with HD given Hgb 7  Still making some urine (400cc O/N)  Hemodynamically stable  DVT ppx with Hep SC  Monitor in ICU during HD
Pt is an 81 yo BF with h/o CKD stage 4 (has had HD) and CVA presented  to the ER 2 to anemia and abd pain.  Pt found to have a Hb 6.2 and was bradycardic but hemodynamically stable. ICU dx: 1) junctional rhythm (bradycardia) 2) Severe anemia, s/p 1 unit PRBC    CVS: Pt no longer bradycardic/ Cont Norvasc (may need to increase dose or add another agent)  HEME: Severe anemia in part 2 to CKD; s/p 1 unit PRBC/ Check with Renal to give pt Epogen  FEN: Po renal diet  Renal/Social: Pt now reconsidering HD; wants to discuss with her son and Renal/F/u as per Renal  Social: If pt is to receive HD will remain in the ICU.
Pt is an 81 yo BF with h/o CKD stage 4 (has had HD) and CVA presented  to the ER 2 to anemia and abd pain.  Pt found to have a Hb 6.2 and was bradycardic but hemodynamically stable. ICU dx: 1) junctional rhythm (bradycardia) 2) Severe anemia, s/p 1 unit PRBC    CVS: Pt no longer sign bradycardic  HEME: Severe anemia in part 2 to CKD; s/p 1 unit PRBC/ ? of giving pt Epogen  FEN: Po renal diet  Renal/Social: Pt states she does not want HD and understands she will eventually die with no HD/ F/u as per Renal  Possible transfer to [facility].

## 2023-05-18 NOTE — PROGRESS NOTE ADULT - SUBJECTIVE AND OBJECTIVE BOX
COVERING RESIDENT: Adolfo Cain (PGY-1)    INTERVAL HPI/OVERNIGHT EVENTS: Episode of AV conduction block, brief sinus pause. Remained HDS. Otherwise no acute overnight events. S/p HD x1 yesterday.    SUBJECTIVE: Patient seen and examined at bedside. Reports ongoing bilat LE pain/neuropathy. Denies fever, chills, chest pain, shortness of breath, abdominal pain, nausea, vomiting, changes in bowel habits, or urinary symptoms.    OBJECTIVE:    VITAL SIGNS:  ICU Vital Signs Last 24 Hrs  T(C): 36.7 (18 May 2023 05:00), Max: 36.7 (18 May 2023 05:00)  T(F): 98.1 (18 May 2023 05:00), Max: 98.1 (18 May 2023 05:00)  HR: 72 (18 May 2023 07:00) (64 - 114)  BP: 146/74 (18 May 2023 07:00) (113/81 - 159/120)  BP(mean): 96 (18 May 2023 07:00) (82 - 128)  ABP: --  ABP(mean): --  RR: 16 (18 May 2023 07:00) (11 - 32)  SpO2: 100% (18 May 2023 07:00) (98% - 100%)    O2 Parameters below as of 18 May 2023 07:00  Patient On (Oxygen Delivery Method): room air    05-17 @ 07:01  -  05-18 @ 07:00  --------------------------------------------------------  IN: 1170 mL / OUT: 2100 mL / NET: -930 mL      CAPILLARY BLOOD GLUCOSE    PHYSICAL EXAM:    General: NAD  HEENT: NC/AT; PERRL, clear conjunctiva  Respiratory: CTA b/l  Cardiovascular: +S1/S2; RRR. RIJ HD catheter.  Abdomen: soft, NT/ND; +BS x4  Extremities: WWP, 2+ peripheral pulses b/l; no LE edema  Skin: normal color and turgor; no rash  Neurological: AAOx3, no focal deficits    MEDICATIONS:  MEDICATIONS  (STANDING):  amLODIPine   Tablet 5 milliGRAM(s) Oral daily  atorvastatin 20 milliGRAM(s) Oral at bedtime  calcium acetate 667 milliGRAM(s) Oral three times a day with meals  chlorhexidine 2% Cloths 1 Application(s) Topical <User Schedule>  DULoxetine 30 milliGRAM(s) Oral daily  ferrous    sulfate 325 milliGRAM(s) Oral daily  folic acid 1 milliGRAM(s) Oral daily  heparin   Injectable 5000 Unit(s) SubCutaneous every 8 hours  mupirocin 2% Nasal 1 Application(s) Both Nostrils two times a day  pantoprazole    Tablet 40 milliGRAM(s) Oral every 12 hours  polyethylene glycol 3350 17 Gram(s) Oral daily  senna 2 Tablet(s) Oral at bedtime  sodium bicarbonate 1300 milliGRAM(s) Oral three times a day    MEDICATIONS  (PRN):  acetaminophen     Tablet .. 650 milliGRAM(s) Oral every 6 hours PRN Mild Pain (1 - 3), Moderate Pain (4 - 6)  benzonatate 100 milliGRAM(s) Oral every 8 hours PRN Cough  oxyCODONE    IR 2.5 milliGRAM(s) Oral every 6 hours PRN Severe Pain (7 - 10)  sodium chloride 0.9% lock flush 10 milliLiter(s) IV Push every 1 hour PRN Pre/post blood products, medications, blood draw, and to maintain line patency    ALLERGIES:  Allergies    No Known Allergies    Intolerances    LABS:                        7.0    9.53  )-----------( 280      ( 18 May 2023 02:40 )             22.0     05-18    139  |  105  |  79<H>  ----------------------------<  88  3.5   |  24  |  6.53<H>    Ca    8.2<L>      18 May 2023 02:40  Phos  4.5     05-18  Mg     2.3     05-18    TPro  6.4  /  Alb  2.6<L>  /  TBili  0.3  /  DBili  x   /  AST  10<L>  /  ALT  9<L>  /  AlkPhos  76  05-17    RADIOLOGY & ADDITIONAL TESTS: Reviewed.

## 2023-05-18 NOTE — PROGRESS NOTE ADULT - ASSESSMENT
BREA ALEJANDRE is a 82y Female with history of CKD stage 4 (Cr 8.42), CVA presenting to the ED w/ anemia and abd pain. Found to be bradycardic, but HDS w/ junctional rhythm likely 2/2 medication, kidney disease, anemia. Anemia likely 2/2 renal/chronic disease. Admitted to ICU for HD monitoring.     PLAN:  NEURO:  -Oxycodone PO 2.5 mg prn q6 for leg pain per palliative; tylenol prn  -Continue home 30 mg PO duloxetine daily  -Monitor mental status    RESPIRATORY:   -Subjective SOB and dry cough on hospital arrival. CT AP w/ bilat small effusions. RVP neg. BNP elevated in setting or renal disease.  -Monitor respiratory status  -Tessalon perles for cough    CARDIOVASCULAR:  -Junctional escape rhythm in ED on arrival, improved spontaneously after prbc's. Likely 2/2 to home medications/renal disease.  -Continue home meds: amlodipine 5 mg daily, lipitor 20 mg daily.   -Holding home lopressor in setting of bradycardia with junctional escape rhythm  -No further interventions at this time per cardiology; AV block and sinus pause overnight - consider repeat cards consult    GI/NUTRITION:  -ED presentation w/ abd pain, CT w/ moderate stool in rectal vault, no other acute findings  -PO diet  -GI ppx: 40 mg protonix BID  -Continue bowel regimen    GENITOURINARY/RENAL:  -CKD 4; baseline Cr ~8  -Albarado in place  -S/p 80 mg lasix after prbc's; continue home dose 40 mg daily  -Holding home spironolactone  -Continue Phoslo and PO sodium bicarbonate  -S/p HD x1 yesterday; repeat HD today  -BUN improved today to 79 s/p HD    HEMATOLOGIC:  -Anemia to hgb 6.2 on arrival. Likely 2/2 to chronic disease, less likely GIB. Improved after 1u prbc's.   -DVT ppx hep sq  -Hgb 7.0 - stable  -Holding off on transfusion   -Start epo per renal    INFECTIOUS DISEASE:  -No acute issues  -UA and RVP neg    ENDOCRINE:  -No acute issues  -TSH wnl  -Monitor glucose; goal <180 mg/dL    GOALS OF CARE:  -Trialing HD; palliative following  -Family/Health Care Proxy: son  -Full Code    Lines:   -Verena DC'd  -R IJ HD catheter 5/17    DISPO: MICU   BREA ALEJANDRE is a 82y Female with history of CKD stage 4 (Cr 8.42), CVA presenting to the ED w/ anemia and abd pain. Found to be bradycardic, but HDS w/ junctional rhythm likely 2/2 medication, kidney disease, anemia. Anemia likely 2/2 renal/chronic disease. Admitted to ICU for HD monitoring.     PLAN:  NEURO:  -Oxycodone PO 2.5 mg prn q6 for leg pain per palliative; tylenol prn  -Continue home 30 mg PO duloxetine daily  -Monitor mental status    RESPIRATORY:   -Subjective SOB and dry cough on hospital arrival. CT AP w/ bilat small effusions. RVP neg. BNP elevated in setting or renal disease.  -Monitor respiratory status  -Tessalon perles for cough    CARDIOVASCULAR:  -Junctional escape rhythm in ED on arrival, improved spontaneously after prbc's. Likely 2/2 to home medications/renal disease.  -Continue home meds: amlodipine 5 mg daily, lipitor 20 mg daily.   -Holding home lopressor in setting of bradycardia with junctional escape rhythm  -No further interventions at this time per cardiology; AV block and sinus pause overnight - consider repeat cards consult    GI/NUTRITION:  -ED presentation w/ abd pain, CT w/ moderate stool in rectal vault, no other acute findings  -PO diet  -GI ppx: 40 mg protonix daily  -Continue bowel regimen    GENITOURINARY/RENAL:  -CKD 4; baseline Cr ~8  -Albarado in place  -S/p 80 mg lasix after prbc's; continue home dose 40 mg daily  -Holding home spironolactone  -Continue Phoslo and PO sodium bicarbonate  -S/p HD x1 yesterday; repeat HD today  -BUN improved today to 79 s/p HD    HEMATOLOGIC:  -Anemia to hgb 6.2 on arrival. Likely 2/2 to chronic disease, less likely GIB. Improved after 1u prbc's.   -DVT ppx hep sq  -Hgb 7.0; Transfuse 1u prbc w/ HD  -Start epo per renal    INFECTIOUS DISEASE:  -No acute issues  -UA and RVP neg    ENDOCRINE:  -No acute issues  -TSH wnl  -Monitor glucose; goal <180 mg/dL    GOALS OF CARE:  -Trialing HD; palliative following  -Family/Health Care Proxy: son  -Full Code    Lines:   -Albarado DC'd  -R IJ HD catheter 5/17    DISPO: MICU

## 2023-05-18 NOTE — PROGRESS NOTE ADULT - SUBJECTIVE AND OBJECTIVE BOX
LEXUS ALEJANDRESAVANA  82y  Patient is a 82y old  Female who presents with a chief complaint of bradycardia and anemia (18 May 2023 08:39)    HPI:  Seen and examined.  Started HD yesterday and she feels better.  Awaiting HD today.    HEALTH ISSUES - PROBLEM Dx:  Bilateral leg pain    Acute renal failure    Decreased appetite    Functional quadriplegia    Encounter for palliative care    Abdominal pain          MEDICATIONS  (STANDING):  amLODIPine   Tablet 5 milliGRAM(s) Oral daily  atorvastatin 20 milliGRAM(s) Oral at bedtime  calcium acetate 667 milliGRAM(s) Oral three times a day with meals  chlorhexidine 2% Cloths 1 Application(s) Topical <User Schedule>  DULoxetine 30 milliGRAM(s) Oral daily  ferrous    sulfate 325 milliGRAM(s) Oral daily  folic acid 1 milliGRAM(s) Oral daily  heparin   Injectable 5000 Unit(s) SubCutaneous every 8 hours  mupirocin 2% Nasal 1 Application(s) Both Nostrils two times a day  pantoprazole    Tablet 40 milliGRAM(s) Oral before breakfast  polyethylene glycol 3350 17 Gram(s) Oral daily  senna 2 Tablet(s) Oral at bedtime  sodium bicarbonate 1300 milliGRAM(s) Oral three times a day    MEDICATIONS  (PRN):  acetaminophen     Tablet .. 650 milliGRAM(s) Oral every 6 hours PRN Mild Pain (1 - 3), Moderate Pain (4 - 6)  benzonatate 100 milliGRAM(s) Oral every 8 hours PRN Cough  oxyCODONE    IR 2.5 milliGRAM(s) Oral every 6 hours PRN Severe Pain (7 - 10)  sodium chloride 0.9% lock flush 10 milliLiter(s) IV Push every 1 hour PRN Pre/post blood products, medications, blood draw, and to maintain line patency    Vital Signs Last 24 Hrs  T(C): 36.3 (18 May 2023 11:01), Max: 36.7 (18 May 2023 05:00)  T(F): 97.3 (18 May 2023 11:01), Max: 98.1 (18 May 2023 05:00)  HR: 95 (18 May 2023 09:00) (56 - 114)  BP: 147/88 (18 May 2023 09:00) (113/81 - 159/120)  BP(mean): 103 (18 May 2023 09:00) (82 - 128)  RR: 20 (18 May 2023 09:00) (11 - 32)  SpO2: 92% (18 May 2023 09:00) (92% - 100%)    Parameters below as of 18 May 2023 07:00  Patient On (Oxygen Delivery Method): room air      Daily     Daily Weight in k.3 (18 May 2023 05:00)    PHYSICAL EXAM:  Constitutional:  She appears comfortable and not distressed. Not diaphoretic.    Neck:  The thyroid is normal. Trachea is midline.     Respiratory: The lungs are clear to auscultation. No dullness and expansion is normal.    Cardiovascular: S1 and S2 are normal. No murmurs rubs or gallops are present.    Gastrointestinal: The abdomen is soft. No tenderness is present. No masses are present. Bowel sounds are normal.    Genitourinary: The bladder is not distended. No CVA tenderness is present.    Extremities: No edema is noted. No deformities are present.    Neurological: Cognition is normal. Tone, power and sensation are normal. Gait is steady.    Skin: No lesions are seen  or palpated.    Lymph Nodes: No lymphadenopathy is present.                            7.0    9.53  )-----------( 280      ( 18 May 2023 02:40 )             22.0     05-18    139  |  105  |  79<H>  ----------------------------<  88  3.5   |  24  |  6.53<H>    Ca    8.2<L>      18 May 2023 02:40  Phos  4.5       Mg     2.3         TPro  6.4  /  Alb  2.6<L>  /  TBili  0.3  /  DBili  x   /  AST  10<L>  /  ALT  9<L>  /  AlkPhos  76  05-17

## 2023-05-19 LAB
ANION GAP SERPL CALC-SCNC: 5 MMOL/L — SIGNIFICANT CHANGE UP (ref 5–17)
ANION GAP SERPL CALC-SCNC: 8 MMOL/L — SIGNIFICANT CHANGE UP (ref 5–17)
BASOPHILS # BLD AUTO: 0.01 K/UL — SIGNIFICANT CHANGE UP (ref 0–0.2)
BASOPHILS NFR BLD AUTO: 0.1 % — SIGNIFICANT CHANGE UP (ref 0–2)
BUN SERPL-MCNC: 49 MG/DL — HIGH (ref 7–23)
BUN SERPL-MCNC: 52 MG/DL — HIGH (ref 7–23)
CALCIUM SERPL-MCNC: 6.9 MG/DL — LOW (ref 8.5–10.1)
CALCIUM SERPL-MCNC: 8.3 MG/DL — LOW (ref 8.5–10.1)
CHLORIDE SERPL-SCNC: 105 MMOL/L — SIGNIFICANT CHANGE UP (ref 96–108)
CHLORIDE SERPL-SCNC: 115 MMOL/L — HIGH (ref 96–108)
CO2 SERPL-SCNC: 22 MMOL/L — SIGNIFICANT CHANGE UP (ref 22–31)
CO2 SERPL-SCNC: 27 MMOL/L — SIGNIFICANT CHANGE UP (ref 22–31)
CREAT SERPL-MCNC: 4 MG/DL — HIGH (ref 0.5–1.3)
CREAT SERPL-MCNC: 4.76 MG/DL — HIGH (ref 0.5–1.3)
EGFR: 11 ML/MIN/1.73M2 — LOW
EGFR: 9 ML/MIN/1.73M2 — LOW
EOSINOPHIL # BLD AUTO: 0.1 K/UL — SIGNIFICANT CHANGE UP (ref 0–0.5)
EOSINOPHIL NFR BLD AUTO: 1.1 % — SIGNIFICANT CHANGE UP (ref 0–6)
GLUCOSE SERPL-MCNC: 85 MG/DL — SIGNIFICANT CHANGE UP (ref 70–99)
GLUCOSE SERPL-MCNC: 96 MG/DL — SIGNIFICANT CHANGE UP (ref 70–99)
HCT VFR BLD CALC: 24.1 % — LOW (ref 34.5–45)
HGB BLD-MCNC: 8 G/DL — LOW (ref 11.5–15.5)
IMM GRANULOCYTES NFR BLD AUTO: 0.4 % — SIGNIFICANT CHANGE UP (ref 0–0.9)
LYMPHOCYTES # BLD AUTO: 1.48 K/UL — SIGNIFICANT CHANGE UP (ref 1–3.3)
LYMPHOCYTES # BLD AUTO: 16.3 % — SIGNIFICANT CHANGE UP (ref 13–44)
MAGNESIUM SERPL-MCNC: 2.1 MG/DL — SIGNIFICANT CHANGE UP (ref 1.6–2.6)
MCHC RBC-ENTMCNC: 27.1 PG — SIGNIFICANT CHANGE UP (ref 27–34)
MCHC RBC-ENTMCNC: 33.2 G/DL — SIGNIFICANT CHANGE UP (ref 32–36)
MCV RBC AUTO: 81.7 FL — SIGNIFICANT CHANGE UP (ref 80–100)
MONOCYTES # BLD AUTO: 0.95 K/UL — HIGH (ref 0–0.9)
MONOCYTES NFR BLD AUTO: 10.5 % — SIGNIFICANT CHANGE UP (ref 2–14)
NEUTROPHILS # BLD AUTO: 6.5 K/UL — SIGNIFICANT CHANGE UP (ref 1.8–7.4)
NEUTROPHILS NFR BLD AUTO: 71.6 % — SIGNIFICANT CHANGE UP (ref 43–77)
NRBC # BLD: 0 /100 WBCS — SIGNIFICANT CHANGE UP (ref 0–0)
PHOSPHATE SERPL-MCNC: 3 MG/DL — SIGNIFICANT CHANGE UP (ref 2.5–4.5)
PLATELET # BLD AUTO: 247 K/UL — SIGNIFICANT CHANGE UP (ref 150–400)
POTASSIUM SERPL-MCNC: 2.8 MMOL/L — CRITICAL LOW (ref 3.5–5.3)
POTASSIUM SERPL-MCNC: 2.9 MMOL/L — CRITICAL LOW (ref 3.5–5.3)
POTASSIUM SERPL-SCNC: 2.8 MMOL/L — CRITICAL LOW (ref 3.5–5.3)
POTASSIUM SERPL-SCNC: 2.9 MMOL/L — CRITICAL LOW (ref 3.5–5.3)
RBC # BLD: 2.95 M/UL — LOW (ref 3.8–5.2)
RBC # FLD: 20.3 % — HIGH (ref 10.3–14.5)
SODIUM SERPL-SCNC: 140 MMOL/L — SIGNIFICANT CHANGE UP (ref 135–145)
SODIUM SERPL-SCNC: 142 MMOL/L — SIGNIFICANT CHANGE UP (ref 135–145)
WBC # BLD: 9.08 K/UL — SIGNIFICANT CHANGE UP (ref 3.8–10.5)
WBC # FLD AUTO: 9.08 K/UL — SIGNIFICANT CHANGE UP (ref 3.8–10.5)

## 2023-05-19 PROCEDURE — 99497 ADVNCD CARE PLAN 30 MIN: CPT

## 2023-05-19 PROCEDURE — 99232 SBSQ HOSP IP/OBS MODERATE 35: CPT

## 2023-05-19 RX ORDER — POTASSIUM CHLORIDE 20 MEQ
40 PACKET (EA) ORAL ONCE
Refills: 0 | Status: COMPLETED | OUTPATIENT
Start: 2023-05-19 | End: 2023-05-19

## 2023-05-19 RX ADMIN — HEPARIN SODIUM 5000 UNIT(S): 5000 INJECTION INTRAVENOUS; SUBCUTANEOUS at 21:46

## 2023-05-19 RX ADMIN — Medication 1300 MILLIGRAM(S): at 05:37

## 2023-05-19 RX ADMIN — Medication 667 MILLIGRAM(S): at 11:48

## 2023-05-19 RX ADMIN — AMLODIPINE BESYLATE 5 MILLIGRAM(S): 2.5 TABLET ORAL at 05:10

## 2023-05-19 RX ADMIN — DULOXETINE HYDROCHLORIDE 30 MILLIGRAM(S): 30 CAPSULE, DELAYED RELEASE ORAL at 14:29

## 2023-05-19 RX ADMIN — CHLORHEXIDINE GLUCONATE 1 APPLICATION(S): 213 SOLUTION TOPICAL at 05:16

## 2023-05-19 RX ADMIN — Medication 1 MILLIGRAM(S): at 11:48

## 2023-05-19 RX ADMIN — MUPIROCIN 1 APPLICATION(S): 20 OINTMENT TOPICAL at 05:10

## 2023-05-19 RX ADMIN — SENNA PLUS 2 TABLET(S): 8.6 TABLET ORAL at 21:47

## 2023-05-19 RX ADMIN — Medication 1300 MILLIGRAM(S): at 21:47

## 2023-05-19 RX ADMIN — MUPIROCIN 1 APPLICATION(S): 20 OINTMENT TOPICAL at 21:47

## 2023-05-19 RX ADMIN — Medication 667 MILLIGRAM(S): at 18:57

## 2023-05-19 RX ADMIN — HEPARIN SODIUM 5000 UNIT(S): 5000 INJECTION INTRAVENOUS; SUBCUTANEOUS at 14:33

## 2023-05-19 RX ADMIN — ATORVASTATIN CALCIUM 20 MILLIGRAM(S): 80 TABLET, FILM COATED ORAL at 21:47

## 2023-05-19 RX ADMIN — Medication 325 MILLIGRAM(S): at 11:48

## 2023-05-19 RX ADMIN — Medication 40 MILLIEQUIVALENT(S): at 14:12

## 2023-05-19 RX ADMIN — Medication 1300 MILLIGRAM(S): at 14:22

## 2023-05-19 RX ADMIN — Medication 40 MILLIEQUIVALENT(S): at 05:10

## 2023-05-19 RX ADMIN — Medication 667 MILLIGRAM(S): at 09:22

## 2023-05-19 RX ADMIN — HEPARIN SODIUM 5000 UNIT(S): 5000 INJECTION INTRAVENOUS; SUBCUTANEOUS at 05:10

## 2023-05-19 RX ADMIN — PANTOPRAZOLE SODIUM 40 MILLIGRAM(S): 20 TABLET, DELAYED RELEASE ORAL at 06:09

## 2023-05-19 NOTE — CHART NOTE - NSCHARTNOTEFT_GEN_A_CORE
BREA ALEJANDRE is a 82y Female with history of CKD stage 4 (Cr 8.42), CVA presenting to the ED w/ anemia and abd pain. Found to be bradycardic, but HDS w/ junctional rhythm likely 2/2 medication, kidney disease, anemia. Anemia likely 2/2 renal/chronic disease. Admitted to ICU for HD monitoring.     PLAN:  NEURO:  -Oxycodone PO 2.5 mg prn q6 for leg pain per palliative; tylenol prn  -Continue home 30 mg PO duloxetine daily  -Monitor mental status    RESPIRATORY:   -Subjective SOB and dry cough on hospital arrival. CT AP w/ bilat small effusions. RVP neg. BNP elevated in setting or renal disease.  -Monitor respiratory status  -Tessalon perles for cough    CARDIOVASCULAR:  -Junctional escape rhythm in ED on arrival, improved spontaneously after prbc's. Likely 2/2 to home medications/renal disease.  -Continue home meds: amlodipine 5 mg daily, lipitor 20 mg daily.   -Holding home lopressor in setting of bradycardia with junctional escape rhythm  -No further interventions at this time per cardiology; AV block and sinus pause  consider repeat cards consult    GI/NUTRITION:  -ED presentation w/ abd pain, CT w/ moderate stool in rectal vault, no other acute findings  -PO diet  -GI ppx: 40 mg protonix daily  -Continue bowel regimen    GENITOURINARY/RENAL:  -CKD 4; baseline Cr ~8  -Albarado in place  -S/p 80 mg lasix after prbc's; continue home dose 40 mg daily  -Holding home spironolactone  -Continue Phoslo and PO sodium bicarbonate  -S/p HD x1 yesterday; repeat HD today  -BUN improved today to 79 s/p HD    HEMATOLOGIC:  -Anemia to hgb 6.2 on arrival. Likely 2/2 to chronic disease, less likely GIB. Improved after 1u prbc's.   -DVT ppx hep sq  -Hgb 7.0; Transfuse 1u prbc w/ HD  -Start epo per renal    INFECTIOUS DISEASE:  -No acute issues  -UA and RVP neg    ENDOCRINE:  -No acute issues  -TSH wnl  -Monitor glucose; goal <180 mg/dL    GOALS OF CARE:  -Trialing HD; palliative following  -Family/Health Care Proxy: son  -Full Code    Lines:   -Albarado DC'd  -R IJ HD catheter 5/17    DISPO: Patient has been accepted to the Hospital medicine service

## 2023-05-19 NOTE — PROGRESS NOTE ADULT - ASSESSMENT
CKD 5:  After further discussions, she will try HD again.  Temporary HD catheter placement.  She wants to hold HD after tomorrow.  PLAN:  - HD tomorrow.  - Remove Temporary HD catheter after HD tomorrow.  - Hospice Care will be ppropriate.  - Renal diet.  - Follow up BMP.    Metabolic Acidosis:  Improved.    Anemia:  Likely of ESRD.  - Fe Profile.  - Will start EPO.

## 2023-05-19 NOTE — PROVIDER CONTACT NOTE (CRITICAL VALUE NOTIFICATION) - PERSON GIVING RESULT:
lab/ Bill
Alfred Gordon
Bill carcamo, laboratory
Leeann/ [place]
lab hilario
keon marquis, respiratory

## 2023-05-19 NOTE — PROGRESS NOTE ADULT - ASSESSMENT
82F PMH CKD stage 4 (Cr 8.42), CVA presenting to the ED w/ anemia and abd pain. Found to be bradycardic, but HDS w/ junctional rhythm likely 2/2 medication, kidney disease, anemia. Anemia likely 2/2 renal/chronic disease. Admitted to ICU for HD monitoring.   - HD done yesterday with 1L removed  - eating well, getting judicious potassium repletion  - HD may be required additionally; decision per Nephro (still making urine)  - Transfer to general medicine with HD catheter in place

## 2023-05-19 NOTE — CHART NOTE - NSCHARTNOTEFT_GEN_A_CORE
ICU DOWN GRADE NOTE      Patient is a 82y old  Female who presents with a chief complaint of bradycardia and anemia (19 May 2023 14:58)      HPI:  82-year-old F w/PMH CKD stage 4 (baseline Crn 8.26)  not on dialysis (but has had urgent HD in the past), breast cancer status postresection, history of CVA with right-sided deficits had lab work done at outpt office with Hb of 6.7 and was told to go to ED. Pt also endorses dry cough abd pain for last few days.  She denies any orthopnea or any lower extremity swelling.  No fevers or chills.  She is nonambulatory at baseline but still urinates. Notably, patient is on a metoprolol at home. In the ED, pt found to be bradycardic to 30s with junctional rhythm on EKG. Pt A/Ox3 and asx with bradycardia and BP in 140s. Trops negative and EKG without ischemic changes. CBC with Hb of 6.2. Bicarb 15 and BUN/Crn 109/8.93.   ICU consulted for bradycardia.     INTERVAL HPI/OVERNIGHT EVENTS:  Patient awake and alert, received HD yesterday. No overnight events, no complaints today.          REVIEW OF SYSTEMS:  all negative except listed above     MEDICATIONS:  acetaminophen     Tablet .. 650 milliGRAM(s) Oral every 6 hours PRN  amLODIPine   Tablet 5 milliGRAM(s) Oral daily  atorvastatin 20 milliGRAM(s) Oral at bedtime  benzonatate 100 milliGRAM(s) Oral every 8 hours PRN  calcium acetate 667 milliGRAM(s) Oral three times a day with meals  chlorhexidine 2% Cloths 1 Application(s) Topical <User Schedule>  DULoxetine 30 milliGRAM(s) Oral daily  epoetin juan m-epbx (RETACRIT) Injectable 19489 Unit(s) IV Push <User Schedule>  ferrous    sulfate 325 milliGRAM(s) Oral daily  folic acid 1 milliGRAM(s) Oral daily  heparin   Injectable 5000 Unit(s) SubCutaneous every 8 hours  mupirocin 2% Nasal 1 Application(s) Both Nostrils two times a day  oxyCODONE    IR 2.5 milliGRAM(s) Oral every 6 hours PRN  pantoprazole    Tablet 40 milliGRAM(s) Oral before breakfast  polyethylene glycol 3350 17 Gram(s) Oral daily  senna 2 Tablet(s) Oral at bedtime  sodium bicarbonate 1300 milliGRAM(s) Oral three times a day  sodium chloride 0.9% lock flush 10 milliLiter(s) IV Push every 1 hour PRN      T(C): 36.8 (05-19-23 @ 14:45), Max: 37.1 (05-18-23 @ 20:00)  HR: 92 (05-19-23 @ 14:57) (66 - 110)  BP: 158/81 (05-19-23 @ 14:45) (126/70 - 167/94)  RR: 22 (05-19-23 @ 14:57) (17 - 30)  SpO2: 100% (05-19-23 @ 14:57) (97% - 100%)  Wt(kg): --Vital Signs Last 24 Hrs  T(C): 36.8 (19 May 2023 14:45), Max: 37.1 (18 May 2023 20:00)  T(F): 98.3 (19 May 2023 14:45), Max: 98.8 (18 May 2023 20:00)  HR: 92 (19 May 2023 14:57) (66 - 110)  BP: 158/81 (19 May 2023 14:45) (126/70 - 167/94)  BP(mean): 105 (19 May 2023 14:45) (81 - 116)  RR: 22 (19 May 2023 14:57) (17 - 30)  SpO2: 100% (19 May 2023 14:57) (97% - 100%)    Parameters below as of 19 May 2023 14:45  Patient On (Oxygen Delivery Method): room air        PHYSICAL EXAM:  Gen: lying comfortably in bed in no apparent distress  HEENT: PERRL, EOMI  Resp: CTA B/L no c/r/w  CVS: S1S2 no m/r/g  Abd: soft NT/ND +BS  Ext: no c/c/e  Neuro: A&Ox3  Consultant(s) Notes Reviewed:  [x ] YES  [ ] NO  Care Discussed with Consultants/Other Providers [ x] YES  [ ] NO    LABS:                        8.0    9.08  )-----------( 247      ( 19 May 2023 03:40 )             24.1     05-19    142  |  115<H>  |  49<H>  ----------------------------<  96  2.8<LL>   |  22  |  4.00<H>    Ca    6.9<L>      19 May 2023 11:50  Phos  3.0     05-19  Mg     2.1     05-19          CAPILLARY BLOOD GLUCOSE                RADIOLOGY & ADDITIONAL TESTS:    Imaging Personally Reviewed:  [x ] YES  [ ] NO      TO FOLLOW UP:  Neuro: A&Ox3, uremia improved with HD  Pulm: on room air  CV: Bradycardia improved, hemodynamically stable  GI: tolerating PO diet  Renal: HD as per nephrology; following  Heme: Anemia from chronic disease, EPO as per Nephrology  General: Previous to hospitalization refusing HD. Needs ongoing GOC decisions about continuing HD long term vs Hospice. Palliative following.   Plan of care discussed with ICU Attending Stacey, Verbal sign out discussed with MD Olivier. ICU DOWN GRADE NOTE      Patient is a 82y old  Female who presents with a chief complaint of bradycardia and anemia (19 May 2023 14:58)      HPI:  82-year-old F w/PMH CKD stage 4 (baseline Crn 8.26)  not on dialysis (but has had urgent HD in the past), breast cancer status postresection, history of CVA with right-sided deficits had lab work done at outpt office with Hb of 6.7 and was told to go to ED. Pt also endorses dry cough abd pain for last few days.  She denies any orthopnea or any lower extremity swelling.  No fevers or chills.  She is nonambulatory at baseline but still urinates. Notably, patient is on a metoprolol at home. In the ED, pt found to be bradycardic to 30s with junctional rhythm on EKG. Pt A/Ox3 and asx with bradycardia and BP in 140s. Trops negative and EKG without ischemic changes. CBC with Hb of 6.2. Bicarb 15 and BUN/Crn 109/8.93.   ICU consulted for bradycardia.     INTERVAL HPI/OVERNIGHT EVENTS:  Patient awake and alert, received HD yesterday. No overnight events, no complaints today.          REVIEW OF SYSTEMS:  all negative except listed above     MEDICATIONS:  acetaminophen     Tablet .. 650 milliGRAM(s) Oral every 6 hours PRN  amLODIPine   Tablet 5 milliGRAM(s) Oral daily  atorvastatin 20 milliGRAM(s) Oral at bedtime  benzonatate 100 milliGRAM(s) Oral every 8 hours PRN  calcium acetate 667 milliGRAM(s) Oral three times a day with meals  chlorhexidine 2% Cloths 1 Application(s) Topical <User Schedule>  DULoxetine 30 milliGRAM(s) Oral daily  epoetin juan m-epbx (RETACRIT) Injectable 40871 Unit(s) IV Push <User Schedule>  ferrous    sulfate 325 milliGRAM(s) Oral daily  folic acid 1 milliGRAM(s) Oral daily  heparin   Injectable 5000 Unit(s) SubCutaneous every 8 hours  mupirocin 2% Nasal 1 Application(s) Both Nostrils two times a day  oxyCODONE    IR 2.5 milliGRAM(s) Oral every 6 hours PRN  pantoprazole    Tablet 40 milliGRAM(s) Oral before breakfast  polyethylene glycol 3350 17 Gram(s) Oral daily  senna 2 Tablet(s) Oral at bedtime  sodium bicarbonate 1300 milliGRAM(s) Oral three times a day  sodium chloride 0.9% lock flush 10 milliLiter(s) IV Push every 1 hour PRN      T(C): 36.8 (05-19-23 @ 14:45), Max: 37.1 (05-18-23 @ 20:00)  HR: 92 (05-19-23 @ 14:57) (66 - 110)  BP: 158/81 (05-19-23 @ 14:45) (126/70 - 167/94)  RR: 22 (05-19-23 @ 14:57) (17 - 30)  SpO2: 100% (05-19-23 @ 14:57) (97% - 100%)  Wt(kg): --Vital Signs Last 24 Hrs  T(C): 36.8 (19 May 2023 14:45), Max: 37.1 (18 May 2023 20:00)  T(F): 98.3 (19 May 2023 14:45), Max: 98.8 (18 May 2023 20:00)  HR: 92 (19 May 2023 14:57) (66 - 110)  BP: 158/81 (19 May 2023 14:45) (126/70 - 167/94)  BP(mean): 105 (19 May 2023 14:45) (81 - 116)  RR: 22 (19 May 2023 14:57) (17 - 30)  SpO2: 100% (19 May 2023 14:57) (97% - 100%)    Parameters below as of 19 May 2023 14:45  Patient On (Oxygen Delivery Method): room air        PHYSICAL EXAM:  Gen: lying comfortably in bed in no apparent distress  HEENT: PERRL, EOMI  Resp: CTA B/L no c/r/w  CVS: S1S2 no m/r/g  Abd: soft NT/ND +BS  Ext: no c/c/e  Neuro: A&Ox3  Consultant(s) Notes Reviewed:  [x ] YES  [ ] NO  Care Discussed with Consultants/Other Providers [ x] YES  [ ] NO    LABS:                        8.0    9.08  )-----------( 247      ( 19 May 2023 03:40 )             24.1     05-19    142  |  115<H>  |  49<H>  ----------------------------<  96  2.8<LL>   |  22  |  4.00<H>    Ca    6.9<L>      19 May 2023 11:50  Phos  3.0     05-19  Mg     2.1     05-19          CAPILLARY BLOOD GLUCOSE                RADIOLOGY & ADDITIONAL TESTS:    Imaging Personally Reviewed:  [x ] YES  [ ] NO      TO FOLLOW UP:  Neuro: A&Ox3, uremia improved with HD  Pulm: on room air  CV: Bradycardia improved, hemodynamically stable  GI: tolerating PO diet  Renal: HD as per nephrology; following  Heme: Anemia from chronic disease, EPO as per Nephrology  General: Previous to hospitalization refusing HD. Needs ongoing GOC decisions about continuing HD long term vs Hospice. Palliative following.   PT no longer requiring ICU level of care and is stable for transfer to Telemetry floor.   Plan of care discussed with ICU Attending Stacey, Verbal sign out discussed with MD Olivier.

## 2023-05-19 NOTE — PROGRESS NOTE ADULT - SUBJECTIVE AND OBJECTIVE BOX
# CC: Patient is a 82y old  Female who presents with a chief complaint of bradycardia and anemia (18 May 2023 12:07)      ## HPI:  82-year-old F w/PMH CKD stage 4 (baseline Crn 8.26)  not on dialysis (but has had urgent HD in the past), breast cancer status postresection, history of CVA with right-sided deficits had lab work done at outpt office with Hb of 6.7 and was told to go to ED. Pt also endorses dry cough abd pain for last few days.  She denies any orthopnea or any lower extremity swelling.  No fevers or chills.  She is nonambulatory at baseline but still urinates. Notably, patient is on a metoprolol at home. In the ED, pt found to be bradycardic to 30s with junctional rhythm on EKG. Pt A/Ox3 and asx with bradycardia and BP in 140s. Trops negative and EKG without ischemic changes. CBC with Hb of 6.2. Bicarb 15 and BUN/Crn 109/8.93.   ICU consulted for bradycardia.     Subjective: Pt seen and examined at the bedside. Pt A/Ox4, satting well on RA, with BP in 140s and selene to 30s. Pt denies CP, SOB, dizziness, palpitations, or lightheadedness. Pt endorses persistent abd pain and cough but denies n/v/SOB. Denies any hematemesis, melena, or any other signs of bleeding    (14 May 2023 19:16)      **O/N:**    ## ROS:    ## Labs:  ** CBC: **                        8.0    9.08  )-----------( 247      ( 19 May 2023 03:40 )             24.1     ** Chem:  **  05-19    140  |  105  |  52<H>  ----------------------------<  85  2.9<LL>   |  27  |  4.76<H>    Ca    8.3<L>      19 May 2023 03:40  Phos  3.0     05-19  Mg     2.1     05-19      ** Coags: **      CAPILLARY BLOOD GLUCOSE              ## Imaging:    ## Medications:  amLODIPine   Tablet 5 milliGRAM(s) Oral daily      benzonatate 100 milliGRAM(s) Oral every 8 hours PRN    acetaminophen     Tablet .. 650 milliGRAM(s) Oral every 6 hours PRN  DULoxetine 30 milliGRAM(s) Oral daily  oxyCODONE    IR 2.5 milliGRAM(s) Oral every 6 hours PRN      heparin   Injectable 5000 Unit(s) SubCutaneous every 8 hours    pantoprazole    Tablet 40 milliGRAM(s) Oral before breakfast  polyethylene glycol 3350 17 Gram(s) Oral daily  senna 2 Tablet(s) Oral at bedtime      atorvastatin 20 milliGRAM(s) Oral at bedtime    epoetin juan m-epbx (RETACRIT) Injectable 50843 Unit(s) IV Push <User Schedule>        ## O/E:  ICU Vital Signs Last 24 Hrs  T(C): 36.2 (19 May 2023 11:25), Max: 37.1 (18 May 2023 20:00)  T(F): 97.1 (19 May 2023 11:25), Max: 98.8 (18 May 2023 20:00)  HR: 93 (19 May 2023 11:25) (61 - 110)  BP: 149/86 (19 May 2023 11:25) (126/70 - 167/94)  BP(mean): 107 (19 May 2023 11:25) (81 - 116)  ABP: --  ABP(mean): --  RR: 25 (19 May 2023 11:25) (15 - 30)  SpO2: 100% (19 May 2023 11:25) (97% - 100%)    O2 Parameters below as of 19 May 2023 08:00  Patient On (Oxygen Delivery Method): room air          I&O's Summary    18 May 2023 07:01  -  19 May 2023 07:00  --------------------------------------------------------  IN: 920 mL / OUT: 1350 mL / NET: -430 mL    19 May 2023 07:01  -  19 May 2023 13:08  --------------------------------------------------------  IN: 0 mL / OUT: 300 mL / NET: -300 mL        Gen: lying comfortably in bed in no apparent distress  HEENT: PERRL, EOMI  Resp: CTA B/L no c/r/w  CVS: S1S2 no m/r/g  Abd: soft NT/ND +BS  Ext: no c/c/e  Neuro: A&Ox3    ## Code status:  Goals of care discussion: [x] yes [ ] no  [x] full code  [ ] DNR  [ ] DNI  [ ] VELMAST # CC: Patient is a 82y old  Female who presents with a chief complaint of bradycardia and anemia (18 May 2023 12:07)      ## HPI:  82-year-old F w/PMH CKD stage 4 (baseline Crn 8.26)  not on dialysis (but has had urgent HD in the past), breast cancer status postresection, history of CVA with right-sided deficits had lab work done at outpt office with Hb of 6.7 and was told to go to ED. Pt also endorses dry cough abd pain for last few days.  She denies any orthopnea or any lower extremity swelling.  No fevers or chills.  She is nonambulatory at baseline but still urinates. Notably, patient is on a metoprolol at home. In the ED, pt found to be bradycardic to 30s with junctional rhythm on EKG. Pt A/Ox3 and asx with bradycardia and BP in 140s. Trops negative and EKG without ischemic changes. CBC with Hb of 6.2. Bicarb 15 and BUN/Crn 109/8.93.   ICU consulted for bradycardia.     Subjective: Pt seen and examined at the bedside. Pt A/Ox4, satting well on RA, with BP in 140s and selene to 30s. Pt denies CP, SOB, dizziness, palpitations, or lightheadedness. Pt endorses persistent abd pain and cough but denies n/v/SOB. Denies any hematemesis, melena, or any other signs of bleeding    (14 May 2023 19:16)    **O/N:**  No acute events overnight    ## ROS:  CONSTITUTIONAL: No fever, chills  EYES: No eye pain, visual disturbances  ENMT:  No difficulty hearing, No sinus or throat pain  RESPIRATORY: No cough, wheezing, hemoptysis; No shortness of breath  CARDIOVASCULAR: No chest pain, palpitations  GASTROINTESTINAL: No abdominal or epigastric pain. No nausea, vomiting, or hematemesis; No diarrhea. No melena or hematochezia.  GENITOURINARY: No dysuria, frequency, hematuria  NEUROLOGICAL: No headaches  ENDOCRINE: No heat or cold intolerance  MUSCULOSKELETAL: No joint pain or swelling; No muscle, back, or extremity pain    ## Labs:  ** CBC: **                        8.0    9.08  )-----------( 247      ( 19 May 2023 03:40 )             24.1     ** Chem:  **  05-19    140  |  105  |  52<H>  ----------------------------<  85  2.9<LL>   |  27  |  4.76<H>    ### BUN / Creatinine:  05-19 @ 11:50: 49 mg/dL / 4.00 mg/dL  05-19 @ 03:40: 52 mg/dL / 4.76 mg/dL  05-18 @ 02:40: 79 mg/dL / 6.53 mg/dL  05-17 @ 03:05: 111 mg/dL / 8.97 mg/dL  05-16 @ 03:30: 117 mg/dL / 9.29 mg/dL  05-15 @ 11:05: 115 mg/dL / 9.03 mg/dL  05-15 @ 03:00: 111 mg/dL / 8.87 mg/dL    Ca    8.3<L>      19 May 2023 03:40  Phos  3.0     05-19  Mg     2.1     05-19    ## Medications:  amLODIPine   Tablet 5 milliGRAM(s) Oral daily  benzonatate 100 milliGRAM(s) Oral every 8 hours PRN  acetaminophen     Tablet .. 650 milliGRAM(s) Oral every 6 hours PRN  DULoxetine 30 milliGRAM(s) Oral daily  oxyCODONE    IR 2.5 milliGRAM(s) Oral every 6 hours PRN  heparin   Injectable 5000 Unit(s) SubCutaneous every 8 hours  pantoprazole    Tablet 40 milliGRAM(s) Oral before breakfast  polyethylene glycol 3350 17 Gram(s) Oral daily  senna 2 Tablet(s) Oral at bedtime  atorvastatin 20 milliGRAM(s) Oral at bedtime  epoetin juan m-epbx (RETACRIT) Injectable 32163 Unit(s) IV Push <User Schedule>    ## O/E:  ICU Vital Signs Last 24 Hrs  T(C): 36.2 (19 May 2023 11:25), Max: 37.1 (18 May 2023 20:00)  T(F): 97.1 (19 May 2023 11:25), Max: 98.8 (18 May 2023 20:00)  HR: 93 (19 May 2023 11:25) (61 - 110)  BP: 149/86 (19 May 2023 11:25) (126/70 - 167/94)  BP(mean): 107 (19 May 2023 11:25) (81 - 116)  ABP: --  ABP(mean): --  RR: 25 (19 May 2023 11:25) (15 - 30)  SpO2: 100% (19 May 2023 11:25) (97% - 100%)    O2 Parameters below as of 19 May 2023 08:00  Patient On (Oxygen Delivery Method): room air  IN: 920 mL / OUT: 1350 mL / NET: -430 mL    Gen: sitting up in bed in no apparent distress  HEENT: EOMI, HD catheter in place RIJ  Resp: CTA B/L no c/r/w  CVS: S1S2 no m/r/g  Abd: soft NT/ND +BS  Ext: no c/c/e  Neuro: A&Ox3    ## Code status:  Goals of care discussion: [x] yes [ ] no  [x] full code  [ ] DNR  [ ] DNI  [ ] MOL

## 2023-05-19 NOTE — PROGRESS NOTE ADULT - SUBJECTIVE AND OBJECTIVE BOX
BREA ALEJANDRE  82y  Patient is a 82y old  Female who presents with a chief complaint of bradycardia and anemia (19 May 2023 14:58)    HPI:  No new complaints.    HEALTH ISSUES - PROBLEM Dx:  Bilateral leg pain    Acute renal failure    Decreased appetite    Functional quadriplegia    Encounter for palliative care    Abdominal pain          MEDICATIONS  (STANDING):  amLODIPine   Tablet 5 milliGRAM(s) Oral daily  atorvastatin 20 milliGRAM(s) Oral at bedtime  calcium acetate 667 milliGRAM(s) Oral three times a day with meals  chlorhexidine 2% Cloths 1 Application(s) Topical <User Schedule>  DULoxetine 30 milliGRAM(s) Oral daily  epoetin juan m-epbx (RETACRIT) Injectable 97543 Unit(s) IV Push <User Schedule>  ferrous    sulfate 325 milliGRAM(s) Oral daily  folic acid 1 milliGRAM(s) Oral daily  heparin   Injectable 5000 Unit(s) SubCutaneous every 8 hours  mupirocin 2% Nasal 1 Application(s) Both Nostrils two times a day  pantoprazole    Tablet 40 milliGRAM(s) Oral before breakfast  polyethylene glycol 3350 17 Gram(s) Oral daily  senna 2 Tablet(s) Oral at bedtime  sodium bicarbonate 1300 milliGRAM(s) Oral three times a day    MEDICATIONS  (PRN):  acetaminophen     Tablet .. 650 milliGRAM(s) Oral every 6 hours PRN Mild Pain (1 - 3), Moderate Pain (4 - 6)  benzonatate 100 milliGRAM(s) Oral every 8 hours PRN Cough  oxyCODONE    IR 2.5 milliGRAM(s) Oral every 6 hours PRN Severe Pain (7 - 10)  sodium chloride 0.9% lock flush 10 milliLiter(s) IV Push every 1 hour PRN Pre/post blood products, medications, blood draw, and to maintain line patency    Vital Signs Last 24 Hrs  T(C): 36.8 (19 May 2023 14:45), Max: 37.1 (18 May 2023 20:00)  T(F): 98.3 (19 May 2023 14:45), Max: 98.8 (18 May 2023 20:00)  HR: 92 (19 May 2023 14:57) (66 - 110)  BP: 158/81 (19 May 2023 14:45) (126/70 - 167/94)  BP(mean): 105 (19 May 2023 14:45) (81 - 116)  RR: 22 (19 May 2023 14:57) (17 - 30)  SpO2: 100% (19 May 2023 14:57) (97% - 100%)    Parameters below as of 19 May 2023 14:45  Patient On (Oxygen Delivery Method): room air      Daily     Daily Weight in k.7 (19 May 2023 04:00)    PHYSICAL EXAM:  Constitutional:  She appears comfortable and not distressed. Not diaphoretic.    Neck:  The thyroid is normal. Trachea is midline.     Respiratory: The lungs are clear to auscultation. No dullness and expansion is normal.    Cardiovascular: S1 and S2 are normal. No mummurs, rubs or gallops are present.    Gastrointestinal: The abdomen is soft. No tenderness is present. No masses are present. Bowel sounds are normal.    Genitourinary: The bladder is not distended. No CVA tenderness is present.    Extremities: No edema is noted. No deformities are present.    Neurological: Cognition is normal. Tone, power and sensation are normal. Gait is steady.    Skin: No leasions are seen  or palpated.    Lymph Nodes: No lymphadenopathy is present.    Psychiatric: Mood is appropriate. No hallucinations or flight of ideas are noted.                              8.0    9.08  )-----------( 247      ( 19 May 2023 03:40 )             24.1         142  |  115<H>  |  49<H>  ----------------------------<  96  2.8<LL>   |  22  |  4.00<H>    Ca    6.9<L>      19 May 2023 11:50  Phos  3.0       Mg     2.1

## 2023-05-19 NOTE — PROVIDER CONTACT NOTE (CRITICAL VALUE NOTIFICATION) - NAME OF MD/NP/PA/DO NOTIFIED:
Dr. Bonds
dr. centeno
EDDIE Delacruz is notified
EDDIE DIETRICH
EDDIE mayfield is notified.
MD Rebollar

## 2023-05-19 NOTE — PROVIDER CONTACT NOTE (CRITICAL VALUE NOTIFICATION) - TEST AND RESULT REPORTED:
Hemoglobin 6.8
k-2.8
hemoglobin 6.2 hematocrit 20.3
potassium 6.3 lactate 8.0
Hemoglobin 6.7 Hematocrit 21.3
Hemoglobin 7

## 2023-05-20 LAB
ALBUMIN SERPL ELPH-MCNC: 2.5 G/DL — LOW (ref 3.3–5)
ALP SERPL-CCNC: 75 U/L — SIGNIFICANT CHANGE UP (ref 40–120)
ALT FLD-CCNC: 16 U/L — SIGNIFICANT CHANGE UP (ref 12–78)
ANION GAP SERPL CALC-SCNC: 8 MMOL/L — SIGNIFICANT CHANGE UP (ref 5–17)
AST SERPL-CCNC: 21 U/L — SIGNIFICANT CHANGE UP (ref 15–37)
BASOPHILS # BLD AUTO: 0.02 K/UL — SIGNIFICANT CHANGE UP (ref 0–0.2)
BASOPHILS NFR BLD AUTO: 0.2 % — SIGNIFICANT CHANGE UP (ref 0–2)
BILIRUB SERPL-MCNC: 0.3 MG/DL — SIGNIFICANT CHANGE UP (ref 0.2–1.2)
BUN SERPL-MCNC: 59 MG/DL — HIGH (ref 7–23)
CALCIUM SERPL-MCNC: 8.4 MG/DL — LOW (ref 8.5–10.1)
CHLORIDE SERPL-SCNC: 106 MMOL/L — SIGNIFICANT CHANGE UP (ref 96–108)
CO2 SERPL-SCNC: 27 MMOL/L — SIGNIFICANT CHANGE UP (ref 22–31)
CREAT SERPL-MCNC: 5.38 MG/DL — HIGH (ref 0.5–1.3)
EGFR: 8 ML/MIN/1.73M2 — LOW
EOSINOPHIL # BLD AUTO: 0.13 K/UL — SIGNIFICANT CHANGE UP (ref 0–0.5)
EOSINOPHIL NFR BLD AUTO: 1.5 % — SIGNIFICANT CHANGE UP (ref 0–6)
GLUCOSE SERPL-MCNC: 149 MG/DL — HIGH (ref 70–99)
HCT VFR BLD CALC: 26.7 % — LOW (ref 34.5–45)
HGB BLD-MCNC: 8.3 G/DL — LOW (ref 11.5–15.5)
IMM GRANULOCYTES NFR BLD AUTO: 0.7 % — SIGNIFICANT CHANGE UP (ref 0–0.9)
LYMPHOCYTES # BLD AUTO: 1.48 K/UL — SIGNIFICANT CHANGE UP (ref 1–3.3)
LYMPHOCYTES # BLD AUTO: 17.4 % — SIGNIFICANT CHANGE UP (ref 13–44)
MAGNESIUM SERPL-MCNC: 2.2 MG/DL — SIGNIFICANT CHANGE UP (ref 1.6–2.6)
MCHC RBC-ENTMCNC: 27.1 PG — SIGNIFICANT CHANGE UP (ref 27–34)
MCHC RBC-ENTMCNC: 31.1 G/DL — LOW (ref 32–36)
MCV RBC AUTO: 87.3 FL — SIGNIFICANT CHANGE UP (ref 80–100)
MONOCYTES # BLD AUTO: 0.72 K/UL — SIGNIFICANT CHANGE UP (ref 0–0.9)
MONOCYTES NFR BLD AUTO: 8.4 % — SIGNIFICANT CHANGE UP (ref 2–14)
NEUTROPHILS # BLD AUTO: 6.12 K/UL — SIGNIFICANT CHANGE UP (ref 1.8–7.4)
NEUTROPHILS NFR BLD AUTO: 71.8 % — SIGNIFICANT CHANGE UP (ref 43–77)
NRBC # BLD: 0 /100 WBCS — SIGNIFICANT CHANGE UP (ref 0–0)
PHOSPHATE SERPL-MCNC: 2.8 MG/DL — SIGNIFICANT CHANGE UP (ref 2.5–4.5)
PLATELET # BLD AUTO: 224 K/UL — SIGNIFICANT CHANGE UP (ref 150–400)
POTASSIUM SERPL-MCNC: 3.3 MMOL/L — LOW (ref 3.5–5.3)
POTASSIUM SERPL-SCNC: 3.3 MMOL/L — LOW (ref 3.5–5.3)
PROT SERPL-MCNC: 6.1 GM/DL — SIGNIFICANT CHANGE UP (ref 6–8.3)
RBC # BLD: 3.06 M/UL — LOW (ref 3.8–5.2)
RBC # FLD: 20.9 % — HIGH (ref 10.3–14.5)
SODIUM SERPL-SCNC: 141 MMOL/L — SIGNIFICANT CHANGE UP (ref 135–145)
WBC # BLD: 8.53 K/UL — SIGNIFICANT CHANGE UP (ref 3.8–10.5)
WBC # FLD AUTO: 8.53 K/UL — SIGNIFICANT CHANGE UP (ref 3.8–10.5)

## 2023-05-20 PROCEDURE — 99232 SBSQ HOSP IP/OBS MODERATE 35: CPT

## 2023-05-20 RX ORDER — ERYTHROPOIETIN 10000 [IU]/ML
10000 INJECTION, SOLUTION INTRAVENOUS; SUBCUTANEOUS
Refills: 0 | Status: DISCONTINUED | OUTPATIENT
Start: 2023-05-20 | End: 2023-05-23

## 2023-05-20 RX ADMIN — ERYTHROPOIETIN 10000 UNIT(S): 10000 INJECTION, SOLUTION INTRAVENOUS; SUBCUTANEOUS at 10:43

## 2023-05-20 RX ADMIN — CHLORHEXIDINE GLUCONATE 1 APPLICATION(S): 213 SOLUTION TOPICAL at 06:00

## 2023-05-20 RX ADMIN — Medication 650 MILLIGRAM(S): at 14:01

## 2023-05-20 RX ADMIN — DULOXETINE HYDROCHLORIDE 30 MILLIGRAM(S): 30 CAPSULE, DELAYED RELEASE ORAL at 13:12

## 2023-05-20 RX ADMIN — POLYETHYLENE GLYCOL 3350 17 GRAM(S): 17 POWDER, FOR SOLUTION ORAL at 13:12

## 2023-05-20 RX ADMIN — ATORVASTATIN CALCIUM 20 MILLIGRAM(S): 80 TABLET, FILM COATED ORAL at 21:55

## 2023-05-20 RX ADMIN — Medication 667 MILLIGRAM(S): at 17:03

## 2023-05-20 RX ADMIN — Medication 325 MILLIGRAM(S): at 13:12

## 2023-05-20 RX ADMIN — AMLODIPINE BESYLATE 5 MILLIGRAM(S): 2.5 TABLET ORAL at 06:00

## 2023-05-20 RX ADMIN — HEPARIN SODIUM 5000 UNIT(S): 5000 INJECTION INTRAVENOUS; SUBCUTANEOUS at 13:29

## 2023-05-20 RX ADMIN — MUPIROCIN 1 APPLICATION(S): 20 OINTMENT TOPICAL at 06:00

## 2023-05-20 RX ADMIN — Medication 1300 MILLIGRAM(S): at 13:13

## 2023-05-20 RX ADMIN — Medication 1300 MILLIGRAM(S): at 05:59

## 2023-05-20 RX ADMIN — HEPARIN SODIUM 5000 UNIT(S): 5000 INJECTION INTRAVENOUS; SUBCUTANEOUS at 21:55

## 2023-05-20 RX ADMIN — Medication 1 MILLIGRAM(S): at 13:13

## 2023-05-20 RX ADMIN — PANTOPRAZOLE SODIUM 40 MILLIGRAM(S): 20 TABLET, DELAYED RELEASE ORAL at 06:02

## 2023-05-20 RX ADMIN — Medication 1300 MILLIGRAM(S): at 21:55

## 2023-05-20 RX ADMIN — Medication 667 MILLIGRAM(S): at 07:59

## 2023-05-20 RX ADMIN — MUPIROCIN 1 APPLICATION(S): 20 OINTMENT TOPICAL at 17:03

## 2023-05-20 RX ADMIN — Medication 667 MILLIGRAM(S): at 13:11

## 2023-05-20 RX ADMIN — Medication 650 MILLIGRAM(S): at 13:56

## 2023-05-20 RX ADMIN — HEPARIN SODIUM 5000 UNIT(S): 5000 INJECTION INTRAVENOUS; SUBCUTANEOUS at 05:59

## 2023-05-20 NOTE — PROGRESS NOTE ADULT - SUBJECTIVE AND OBJECTIVE BOX
LEXUS ALEJANDRESAVANA  82y  Patient is a 82y old  Female who presents with a chief complaint of bradycardia and anemia (19 May 2023 17:04)    HPI: Seen and examined at HD. She does not want to continue HD after this treatment.  I discussed this with at length.    HEALTH ISSUES - PROBLEM Dx:  Bilateral leg pain    Acute renal failure    Decreased appetite    Functional quadriplegia    Encounter for palliative care    Abdominal pain          MEDICATIONS  (STANDING):  amLODIPine   Tablet 5 milliGRAM(s) Oral daily  atorvastatin 20 milliGRAM(s) Oral at bedtime  calcium acetate 667 milliGRAM(s) Oral three times a day with meals  chlorhexidine 2% Cloths 1 Application(s) Topical <User Schedule>  DULoxetine 30 milliGRAM(s) Oral daily  epoetin juan m (PROCRIT) Injectable 74248 Unit(s) IV Push <User Schedule>  ferrous    sulfate 325 milliGRAM(s) Oral daily  folic acid 1 milliGRAM(s) Oral daily  heparin   Injectable 5000 Unit(s) SubCutaneous every 8 hours  mupirocin 2% Nasal 1 Application(s) Both Nostrils two times a day  pantoprazole    Tablet 40 milliGRAM(s) Oral before breakfast  polyethylene glycol 3350 17 Gram(s) Oral daily  senna 2 Tablet(s) Oral at bedtime  sodium bicarbonate 1300 milliGRAM(s) Oral three times a day    MEDICATIONS  (PRN):  acetaminophen     Tablet .. 650 milliGRAM(s) Oral every 6 hours PRN Mild Pain (1 - 3), Moderate Pain (4 - 6)  benzonatate 100 milliGRAM(s) Oral every 8 hours PRN Cough  oxyCODONE    IR 2.5 milliGRAM(s) Oral every 6 hours PRN Severe Pain (7 - 10)  sodium chloride 0.9% lock flush 10 milliLiter(s) IV Push every 1 hour PRN Pre/post blood products, medications, blood draw, and to maintain line patency    Vital Signs Last 24 Hrs  T(C): 36.8 (20 May 2023 08:20), Max: 36.8 (19 May 2023 14:45)  T(F): 98.2 (20 May 2023 08:20), Max: 98.3 (19 May 2023 14:45)  HR: 68 (20 May 2023 08:20) (68 - 93)  BP: 145/77 (20 May 2023 08:20) (145/77 - 164/80)  BP(mean): 105 (19 May 2023 14:45) (105 - 107)  RR: 18 (20 May 2023 08:20) (18 - 25)  SpO2: 99% (20 May 2023 08:20) (97% - 100%)    Parameters below as of 20 May 2023 08:20  Patient On (Oxygen Delivery Method): room air      Daily     Daily Weight in k.7 (20 May 2023 08:20)    PHYSICAL EXAM:  Constitutional:  She appears comfortable and not distressed. Not diaphoretic.    Neck:  The thyroid is normal. Trachea is midline.     Breasts: Normal examination.    Respiratory: The lungs are clear to auscultation. No dullness and expansion is normal.    Cardiovascular: S1 and S2 are normal. No mummurs, rubs or gallops are present.    Gastrointestinal: The abdomen is soft. No tenderness is present. No masses are present. Bowel sounds are normal.    Genitourinary: The bladder is not distended. No CVA tenderness is present.    Extremities: No edema is noted. No deformities are present.    Neurological: Cognition is normal. Tone, power and sensation are normal. Gait is steady.    Skin: No leasions are seen  or palpated.    Lymph Nodes: No lymphadenopathy is present.    Psychiatric: Mood is appropriate. No hallucinations or flight of ideas are noted.                              8.0    9.08  )-----------( 247      ( 19 May 2023 03:40 )             24.1     05-20    141  |  106  |  59<H>  ----------------------------<  149<H>  3.3<L>   |  27  |  5.38<H>    Ca    8.4<L>      20 May 2023 10:00  Phos  2.8     05-20  Mg     2.2     05-20    TPro  6.1  /  Alb  2.5<L>  /  TBili  0.3  /  DBili  x   /  AST  21  /  ALT  16  /  AlkPhos  75  05-20

## 2023-05-20 NOTE — PROGRESS NOTE ADULT - ASSESSMENT
82y Female with history of CKD stage 4 (Cr 8.42), CVA presenting to the ED w/ anemia and abd pain. Found to be bradycardic, but HDS w/ junctional rhythm likely 2/2 medication, kidney disease, anemia. Anemia likely 2/2 renal/chronic disease. Admitted to ICU for HD monitoring.     CKD stage 5  -pt had HD today   -Temporary HD catheter placement.  -might need hospice as per renal?     Anemia  most likely due to ESRD  -s/p transfusion 2 units   -Start epo per renal  Continue to monitor     Abdominal pain  -pt was full of stool  after having 5 Bm today- feels much better, abdominal pain subsided      Hypoxic respiratory distress-resolved  pt sating on room air   CT AP w/ bilat small effusions. RVP neg. BNP elevated in setting or renal disease.  -Monitor respiratory status  -Tessalon perles for cough    Junctional escape rhythm in ED on arrival, improved spontaneously after prbc's. Likely 2/2 to home medications/renal disease.  -Continue home meds: amlodipine 5 mg daily, lipitor 20 mg daily.   -Holding home lopressor in setting of bradycardia with junctional escape rhythm  -No further interventions at this time per cardiology; AV block and sinus pause  consider repeat cards consult      -DVT ppx hep sq          GOALS OF CARE:  -Trialing HD; palliative following  -Family/Health Care Proxy: son  -Full Code    Lines:   -Verena CRUZ'd  -R IJ HD catheter 5/17

## 2023-05-20 NOTE — PROGRESS NOTE ADULT - CARDIOVASCULAR SYMPTOMS
dyspnea on exertion/peripheral edema

## 2023-05-20 NOTE — PROGRESS NOTE ADULT - ASSESSMENT
CKD 5:  After further discussions, she will try HD again.  Temporary HD catheter placement.  She wants to hold HD going forward.  The risks and benefits of this decision was discussed with her.  PLAN:  - Remove Temporary HD catheter after HD.  - Hospice Care will be appropriate.    - Renal diet.  - Follow up BMP.    Metabolic Acidosis:  Improved.    Anemia:  Likely of ESRD.  - Fe Profile.  - Will start EPO.

## 2023-05-20 NOTE — PROGRESS NOTE ADULT - SUBJECTIVE AND OBJECTIVE BOX
Patient is a 82y old  Female who presents with a chief complaint of bradycardia and anemia (20 May 2023 10:46)      INTERVAL HPI/OVERNIGHT EVENTS:    MEDICATIONS  (STANDING):  amLODIPine   Tablet 5 milliGRAM(s) Oral daily  atorvastatin 20 milliGRAM(s) Oral at bedtime  calcium acetate 667 milliGRAM(s) Oral three times a day with meals  chlorhexidine 2% Cloths 1 Application(s) Topical <User Schedule>  DULoxetine 30 milliGRAM(s) Oral daily  epoetin juan m (PROCRIT) Injectable 80256 Unit(s) IV Push <User Schedule>  ferrous    sulfate 325 milliGRAM(s) Oral daily  folic acid 1 milliGRAM(s) Oral daily  heparin   Injectable 5000 Unit(s) SubCutaneous every 8 hours  mupirocin 2% Nasal 1 Application(s) Both Nostrils two times a day  pantoprazole    Tablet 40 milliGRAM(s) Oral before breakfast  polyethylene glycol 3350 17 Gram(s) Oral daily  senna 2 Tablet(s) Oral at bedtime  sodium bicarbonate 1300 milliGRAM(s) Oral three times a day    MEDICATIONS  (PRN):  acetaminophen     Tablet .. 650 milliGRAM(s) Oral every 6 hours PRN Mild Pain (1 - 3), Moderate Pain (4 - 6)  benzonatate 100 milliGRAM(s) Oral every 8 hours PRN Cough  oxyCODONE    IR 2.5 milliGRAM(s) Oral every 6 hours PRN Severe Pain (7 - 10)  sodium chloride 0.9% lock flush 10 milliLiter(s) IV Push every 1 hour PRN Pre/post blood products, medications, blood draw, and to maintain line patency      Allergies    No Known Allergies    Intolerances        REVIEW OF SYSTEMS:  CONSTITUTIONAL: No fever, weight loss, or fatigue  EYES: No eye pain, visual disturbances, or discharge  ENMT:  No difficulty hearing, tinnitus, vertigo; No sinus or throat pain  NECK: No pain or stiffness  BREASTS: No pain, masses, or nipple discharge  RESPIRATORY: No cough, wheezing, chills or hemoptysis; No shortness of breath  CARDIOVASCULAR: No chest pain, palpitations, dizziness, or leg swelling  GASTROINTESTINAL: No abdominal or epigastric pain. No nausea, vomiting, or hematemesis; No diarrhea or constipation. No melena or hematochezia.  GENITOURINARY: No dysuria, frequency, hematuria, or incontinence  NEUROLOGICAL: No headaches, memory loss, loss of strength, numbness, or tremors  SKIN: No itching, burning, rashes, or lesions   LYMPH NODES: No enlarged glands  ENDOCRINE: No heat or cold intolerance; No hair loss  MUSCULOSKELETAL: No joint pain or swelling; No muscle, back, or extremity pain  PSYCHIATRIC: No depression, anxiety, mood swings, or difficulty sleeping  HEME/LYMPH: No easy bruising, or bleeding gums  ALLERGY AND IMMUNOLOGIC: No hives or eczema    Vital Signs Last 24 Hrs  T(C): 37.3 (20 May 2023 16:14), Max: 37.3 (20 May 2023 16:14)  T(F): 99.2 (20 May 2023 16:14), Max: 99.2 (20 May 2023 16:14)  HR: 83 (20 May 2023 16:14) (64 - 83)  BP: 158/95 (20 May 2023 16:14) (127/64 - 164/80)  BP(mean): --  RR: 18 (20 May 2023 16:14) (18 - 21)  SpO2: 96% (20 May 2023 16:14) (96% - 99%)    Parameters below as of 20 May 2023 16:14  Patient On (Oxygen Delivery Method): room air        PHYSICAL EXAM:  GENERAL: NAD, well-groomed, well-developed  HEAD:  Atraumatic, Normocephalic  EYES: EOMI, PERRLA, conjunctiva and sclera clear  ENMT: No tonsillar erythema, exudates, or enlargement; Moist mucous membranes, Good dentition, No lesions  NECK: Supple, No JVD, Normal thyroid  NERVOUS SYSTEM:  Alert & Oriented X3, Good concentration; Motor Strength 5/5 B/L upper and lower extremities; DTRs 2+ intact and symmetric  CHEST/LUNG: Clear to percussion bilaterally; No rales, rhonchi, wheezing, or rubs  HEART: Regular rate and rhythm; No murmurs, rubs, or gallops  ABDOMEN: Soft, Nontender, Nondistended; Bowel sounds present  EXTREMITIES:  2+ Peripheral Pulses, No clubbing, cyanosis, or edema  LYMPH: No lymphadenopathy noted  SKIN: No rashes or lesions    LABS:                        8.3    8.53  )-----------( 224      ( 20 May 2023 10:00 )             26.7     05-20    141  |  106  |  59<H>  ----------------------------<  149<H>  3.3<L>   |  27  |  5.38<H>    Ca    8.4<L>      20 May 2023 10:00  Phos  2.8     05-20  Mg     2.2     05-20    TPro  6.1  /  Alb  2.5<L>  /  TBili  0.3  /  DBili  x   /  AST  21  /  ALT  16  /  AlkPhos  75  05-20        CAPILLARY BLOOD GLUCOSE          RADIOLOGY & ADDITIONAL TESTS:    Imaging Personally Reviewed:  [ ] YES  [ ] NO    Consultant(s) Notes Reviewed:  [ ] YES  [ ] NO    Care Discussed with Consultants/Other Providers [ ] YES  [ ] NO

## 2023-05-21 LAB
ANION GAP SERPL CALC-SCNC: 6 MMOL/L — SIGNIFICANT CHANGE UP (ref 5–17)
BUN SERPL-MCNC: 36 MG/DL — HIGH (ref 7–23)
CALCIUM SERPL-MCNC: 8.3 MG/DL — LOW (ref 8.5–10.1)
CHLORIDE SERPL-SCNC: 104 MMOL/L — SIGNIFICANT CHANGE UP (ref 96–108)
CO2 SERPL-SCNC: 29 MMOL/L — SIGNIFICANT CHANGE UP (ref 22–31)
CREAT SERPL-MCNC: 4.19 MG/DL — HIGH (ref 0.5–1.3)
EGFR: 10 ML/MIN/1.73M2 — LOW
GLUCOSE SERPL-MCNC: 84 MG/DL — SIGNIFICANT CHANGE UP (ref 70–99)
MAGNESIUM SERPL-MCNC: 2.1 MG/DL — SIGNIFICANT CHANGE UP (ref 1.6–2.6)
POTASSIUM SERPL-MCNC: 3.3 MMOL/L — LOW (ref 3.5–5.3)
POTASSIUM SERPL-SCNC: 3.3 MMOL/L — LOW (ref 3.5–5.3)
SODIUM SERPL-SCNC: 139 MMOL/L — SIGNIFICANT CHANGE UP (ref 135–145)

## 2023-05-21 PROCEDURE — 99232 SBSQ HOSP IP/OBS MODERATE 35: CPT

## 2023-05-21 RX ADMIN — AMLODIPINE BESYLATE 5 MILLIGRAM(S): 2.5 TABLET ORAL at 05:57

## 2023-05-21 RX ADMIN — MUPIROCIN 1 APPLICATION(S): 20 OINTMENT TOPICAL at 17:07

## 2023-05-21 RX ADMIN — Medication 325 MILLIGRAM(S): at 13:20

## 2023-05-21 RX ADMIN — Medication 1 MILLIGRAM(S): at 13:20

## 2023-05-21 RX ADMIN — MUPIROCIN 1 APPLICATION(S): 20 OINTMENT TOPICAL at 05:56

## 2023-05-21 RX ADMIN — CHLORHEXIDINE GLUCONATE 1 APPLICATION(S): 213 SOLUTION TOPICAL at 05:55

## 2023-05-21 RX ADMIN — DULOXETINE HYDROCHLORIDE 30 MILLIGRAM(S): 30 CAPSULE, DELAYED RELEASE ORAL at 13:20

## 2023-05-21 RX ADMIN — HEPARIN SODIUM 5000 UNIT(S): 5000 INJECTION INTRAVENOUS; SUBCUTANEOUS at 05:56

## 2023-05-21 RX ADMIN — Medication 1300 MILLIGRAM(S): at 13:20

## 2023-05-21 RX ADMIN — Medication 1300 MILLIGRAM(S): at 05:56

## 2023-05-21 RX ADMIN — HEPARIN SODIUM 5000 UNIT(S): 5000 INJECTION INTRAVENOUS; SUBCUTANEOUS at 21:36

## 2023-05-21 RX ADMIN — ATORVASTATIN CALCIUM 20 MILLIGRAM(S): 80 TABLET, FILM COATED ORAL at 21:36

## 2023-05-21 RX ADMIN — Medication 650 MILLIGRAM(S): at 19:59

## 2023-05-21 RX ADMIN — Medication 667 MILLIGRAM(S): at 17:05

## 2023-05-21 RX ADMIN — Medication 667 MILLIGRAM(S): at 09:09

## 2023-05-21 RX ADMIN — Medication 667 MILLIGRAM(S): at 13:20

## 2023-05-21 RX ADMIN — HEPARIN SODIUM 5000 UNIT(S): 5000 INJECTION INTRAVENOUS; SUBCUTANEOUS at 13:19

## 2023-05-21 RX ADMIN — PANTOPRAZOLE SODIUM 40 MILLIGRAM(S): 20 TABLET, DELAYED RELEASE ORAL at 05:57

## 2023-05-21 RX ADMIN — Medication 1300 MILLIGRAM(S): at 21:36

## 2023-05-21 RX ADMIN — Medication 650 MILLIGRAM(S): at 19:30

## 2023-05-21 NOTE — PROGRESS NOTE ADULT - ASSESSMENT
82y Female with history of CKD stage 4 (Cr 8.42), CVA presenting to the ED w/ anemia and abd pain. Found to be bradycardic, but HDS w/ junctional rhythm likely 2/2 medication, kidney disease, anemia. Anemia likely 2/2 renal/chronic disease. Admitted to ICU for HD monitoring.     CKD stage 5  -s/p few sessions of HD   -Temporary HD catheter placement- DR. Mata will remove today     Anemia  most likely due to ESRD  -s/p transfusion 2 units   -Start epo per renal  Continue to monitor     Abdominal pain  -pt was full of stool  after having 5 Bm today- feels much better, abdominal pain subsided      Hypoxic respiratory distress-resolved  pt sating on room air   CT AP w/ bilat small effusions. RVP neg. BNP elevated in setting or renal disease.  -Monitor respiratory status  -Tessalon perles for cough    Junctional escape rhythm in ED on arrival, improved spontaneously after prbc's. Likely 2/2 to home medications/renal disease.  -Continue home meds: amlodipine 5 mg daily, lipitor 20 mg daily.   -Holding home lopressor in setting of bradycardia with junctional escape rhythm  -No further interventions at this time per cardiology; AV block and sinus pause  consider repeat cards consult    Functional Quadriplegia due to lower ext weakness  Uses a wheelchair       -DVT ppx hep sq    GOALs of Care:  Had a long conversation with patient and her son today---Patient decision is to do HD only when she feels "sick" and will come to the hospital for that, otherwise does not want to be on long-term HD. Had seen to many family members suffer from side effects of HD. Patient is AOx4 and raises understanding that not going through HD may cause AMS- and eventually death. Patient is aware, however states she is not interested in Hospice either---she will simply come to the hospital on per siri basis. Son at bedside agrees to patient's plans.     -Family/Health Care Proxy: son  -Full Code    Plan: IJ HD catheter to be removed by Dr. Mata today, Palliative to follow tomorrow again and discharge home. Pt lives alone but has an aid.

## 2023-05-21 NOTE — PROGRESS NOTE ADULT - SUBJECTIVE AND OBJECTIVE BOX
BREA ALEJANDRE  82y  Patient is a 82y old  Female who presents with a chief complaint of bradycardia and anemia (21 May 2023 13:40)    HPI:  Seen and examined. She does not want HD to continue and wishes her catheter to be removed.  No new complaints.     HEALTH ISSUES - PROBLEM Dx:  Bilateral leg pain    Acute renal failure    Decreased appetite    Functional quadriplegia    Encounter for palliative care    Abdominal pain          MEDICATIONS  (STANDING):  amLODIPine   Tablet 5 milliGRAM(s) Oral daily  atorvastatin 20 milliGRAM(s) Oral at bedtime  calcium acetate 667 milliGRAM(s) Oral three times a day with meals  chlorhexidine 2% Cloths 1 Application(s) Topical <User Schedule>  DULoxetine 30 milliGRAM(s) Oral daily  epoetin juan m (PROCRIT) Injectable 12588 Unit(s) IV Push <User Schedule>  ferrous    sulfate 325 milliGRAM(s) Oral daily  folic acid 1 milliGRAM(s) Oral daily  heparin   Injectable 5000 Unit(s) SubCutaneous every 8 hours  mupirocin 2% Nasal 1 Application(s) Both Nostrils two times a day  pantoprazole    Tablet 40 milliGRAM(s) Oral before breakfast  polyethylene glycol 3350 17 Gram(s) Oral daily  senna 2 Tablet(s) Oral at bedtime  sodium bicarbonate 1300 milliGRAM(s) Oral three times a day    MEDICATIONS  (PRN):  acetaminophen     Tablet .. 650 milliGRAM(s) Oral every 6 hours PRN Mild Pain (1 - 3), Moderate Pain (4 - 6)  benzonatate 100 milliGRAM(s) Oral every 8 hours PRN Cough  oxyCODONE    IR 2.5 milliGRAM(s) Oral every 6 hours PRN Severe Pain (7 - 10)  sodium chloride 0.9% lock flush 10 milliLiter(s) IV Push every 1 hour PRN Pre/post blood products, medications, blood draw, and to maintain line patency    Vital Signs Last 24 Hrs  T(C): 36.7 (21 May 2023 11:00), Max: 37.3 (20 May 2023 16:14)  T(F): 98 (21 May 2023 11:00), Max: 99.2 (20 May 2023 16:14)  HR: 84 (21 May 2023 11:00) (83 - 84)  BP: 145/69 (21 May 2023 11:00) (145/69 - 158/95)  BP(mean): --  RR: 18 (21 May 2023 11:00) (18 - 18)  SpO2: 96% (21 May 2023 11:00) (96% - 96%)    Parameters below as of 21 May 2023 11:00  Patient On (Oxygen Delivery Method): room air      Daily     Daily     PHYSICAL EXAM:  Constitutional:  She appears comfortable and not distressed. Not diaphoretic.    Neck:  The thyroid is normal. Trachea is midline. Right IJ catheter.    Respiratory: The lungs are clear to auscultation. No dullness and expansion is normal.    Cardiovascular: S1 and S2 are normal. No mummurs, rubs or gallops are present.    Gastrointestinal: The abdomen is soft. No tenderness is present. No masses are present. Bowel sounds are normal.    Genitourinary: The bladder is not distended. No CVA tenderness is present.    Extremities: No edema is noted. No deformities are present.    Neurological: Cognition is normal. Paraplegic.    Skin: No lesions are seen  or palpated.    Lymph Nodes: No lymphadenopathy is present.                            8.3    8.53  )-----------( 224      ( 20 May 2023 10:00 )             26.7     05-21    139  |  104  |  36<H>  ----------------------------<  84  3.3<L>   |  29  |  4.19<H>    Ca    8.3<L>      21 May 2023 08:05  Phos  2.8     05-20  Mg     2.1     05-21    TPro  6.1  /  Alb  2.5<L>  /  TBili  0.3  /  DBili  x   /  AST  21  /  ALT  16  /  AlkPhos  75  05-20

## 2023-05-21 NOTE — PROGRESS NOTE ADULT - ASSESSMENT
CKD 5:  She was started on HD urgently and improved. Feels better, but she does not want to continue. She stated that she does NOT want long term HD. She has many family members with ESRD and is familiar with the benefits and complications.  When questioned further, she stated that when she goes home, she thinks she will be OK for about 3 months and may need HD again. She will want only Acute HD at that time. She does not think that she is ready for Hospice and wants to go home. I explained to her and her son that the time to recurrence of symptoms will likely be far shorter as she would have lost renal function. She is open to a discussion on Home Hospice even though she feels she is not ready. She also does not want [facility] with onsite HD as she wants to stay in her apartment.    PLAN:  - Remove Temporary HD catheter.  - Palliative Care follow up to explain options and process of Hospice..    - Renal diet.  - Follow up BMP.    Metabolic Acidosis:  Improved.    Anemia:  Likely of ESRD.  - Fe Profile.  - Will start EPO.

## 2023-05-21 NOTE — PROGRESS NOTE ADULT - SUBJECTIVE AND OBJECTIVE BOX
Patient is a 82y old  Female who presents with a chief complaint of bradycardia and anemia (20 May 2023 10:46)      INTERVAL HPI/OVERNIGHT EVENTS: pt doing well, no acute events overnight. Would like to go home. Patient states she does not want anymore HD - to take her IV out of her neck.     MEDICATIONS  (STANDING):  amLODIPine   Tablet 5 milliGRAM(s) Oral daily  atorvastatin 20 milliGRAM(s) Oral at bedtime  calcium acetate 667 milliGRAM(s) Oral three times a day with meals  chlorhexidine 2% Cloths 1 Application(s) Topical <User Schedule>  DULoxetine 30 milliGRAM(s) Oral daily  epoetin juan m (PROCRIT) Injectable 34080 Unit(s) IV Push <User Schedule>  ferrous    sulfate 325 milliGRAM(s) Oral daily  folic acid 1 milliGRAM(s) Oral daily  heparin   Injectable 5000 Unit(s) SubCutaneous every 8 hours  mupirocin 2% Nasal 1 Application(s) Both Nostrils two times a day  pantoprazole    Tablet 40 milliGRAM(s) Oral before breakfast  polyethylene glycol 3350 17 Gram(s) Oral daily  senna 2 Tablet(s) Oral at bedtime  sodium bicarbonate 1300 milliGRAM(s) Oral three times a day    MEDICATIONS  (PRN):  acetaminophen     Tablet .. 650 milliGRAM(s) Oral every 6 hours PRN Mild Pain (1 - 3), Moderate Pain (4 - 6)  benzonatate 100 milliGRAM(s) Oral every 8 hours PRN Cough  oxyCODONE    IR 2.5 milliGRAM(s) Oral every 6 hours PRN Severe Pain (7 - 10)  sodium chloride 0.9% lock flush 10 milliLiter(s) IV Push every 1 hour PRN Pre/post blood products, medications, blood draw, and to maintain line patency      Allergies    No Known Allergies    Intolerances        REVIEW OF SYSTEMS:  CONSTITUTIONAL: No fever, weight loss, or fatigue  EYES: No eye pain, visual disturbances, or discharge  ENMT:  No difficulty hearing, tinnitus, vertigo; No sinus or throat pain  NECK: No pain or stiffness  BREASTS: No pain, masses, or nipple discharge  RESPIRATORY: No cough, wheezing, chills or hemoptysis; No shortness of breath  CARDIOVASCULAR: No chest pain, palpitations, dizziness, or leg swelling  GASTROINTESTINAL: No abdominal or epigastric pain. No nausea, vomiting, or hematemesis; No diarrhea or constipation. No melena or hematochezia.  GENITOURINARY: No dysuria, frequency, hematuria, or incontinence  NEUROLOGICAL: No headaches, memory loss, loss of strength, numbness, or tremors  SKIN: No itching, burning, rashes, or lesions   LYMPH NODES: No enlarged glands  ENDOCRINE: No heat or cold intolerance; No hair loss  MUSCULOSKELETAL: No joint pain or swelling; No muscle, back, or extremity pain  PSYCHIATRIC: No depression, anxiety, mood swings, or difficulty sleeping  HEME/LYMPH: No easy bruising, or bleeding gums  ALLERGY AND IMMUNOLOGIC: No hives or eczema    Vital Signs Last 24 Hrs  T(C): 37.3 (20 May 2023 16:14), Max: 37.3 (20 May 2023 16:14)  T(F): 99.2 (20 May 2023 16:14), Max: 99.2 (20 May 2023 16:14)  HR: 83 (20 May 2023 16:14) (64 - 83)  BP: 158/95 (20 May 2023 16:14) (127/64 - 164/80)  BP(mean): --  RR: 18 (20 May 2023 16:14) (18 - 21)  SpO2: 96% (20 May 2023 16:14) (96% - 99%)    Parameters below as of 20 May 2023 16:14  Patient On (Oxygen Delivery Method): room air        PHYSICAL EXAM:  GENERAL: NAD, well-groomed, well-developed  HEAD:  Atraumatic, Normocephalic  EYES: EOMI, PERRLA, conjunctiva and sclera clear  ENMT: No tonsillar erythema, exudates, or enlargement; Moist mucous membranes, Good dentition, No lesions  NECK: Supple, No JVD, Normal thyroid  NERVOUS SYSTEM:  Alert & Oriented X3, Good concentration; Motor Strength 5/5 B/L upper and lower extremities; DTRs 2+ intact and symmetric  CHEST/LUNG: Clear to percussion bilaterally; No rales, rhonchi, wheezing, or rubs  HEART: Regular rate and rhythm; No murmurs, rubs, or gallops  ABDOMEN: Soft, Nontender, Nondistended; Bowel sounds present  EXTREMITIES:  2+ Peripheral Pulses, No clubbing, cyanosis, or edema  LYMPH: No lymphadenopathy noted  SKIN: No rashes or lesions    LABS:                        8.3    8.53  )-----------( 224      ( 20 May 2023 10:00 )             26.7     05-20    141  |  106  |  59<H>  ----------------------------<  149<H>  3.3<L>   |  27  |  5.38<H>    Ca    8.4<L>      20 May 2023 10:00  Phos  2.8     05-20  Mg     2.2     05-20    TPro  6.1  /  Alb  2.5<L>  /  TBili  0.3  /  DBili  x   /  AST  21  /  ALT  16  /  AlkPhos  75  05-20        CAPILLARY BLOOD GLUCOSE          RADIOLOGY & ADDITIONAL TESTS:    Imaging Personally Reviewed:  [ ] YES  [ ] NO    Consultant(s) Notes Reviewed:  [ ] YES  [ ] NO    Care Discussed with Consultants/Other Providers [ ] YES  [ ] NO

## 2023-05-22 LAB
% ALBUMIN: 54.9 % — SIGNIFICANT CHANGE UP
% ALPHA 1: 6.6 % — SIGNIFICANT CHANGE UP
% ALPHA 2: 15.6 % — SIGNIFICANT CHANGE UP
% BETA: 11.3 % — SIGNIFICANT CHANGE UP
% GAMMA: 11.6 % — SIGNIFICANT CHANGE UP
ALBUMIN SERPL ELPH-MCNC: 3.1 G/DL — LOW (ref 3.6–5.5)
ALBUMIN/GLOB SERPL ELPH: 1.2 RATIO — SIGNIFICANT CHANGE UP
ALPHA1 GLOB SERPL ELPH-MCNC: 0.4 G/DL — SIGNIFICANT CHANGE UP (ref 0.1–0.4)
ALPHA2 GLOB SERPL ELPH-MCNC: 0.9 G/DL — SIGNIFICANT CHANGE UP (ref 0.5–1)
ANION GAP SERPL CALC-SCNC: 7 MMOL/L — SIGNIFICANT CHANGE UP (ref 5–17)
B-GLOBULIN SERPL ELPH-MCNC: 0.6 G/DL — SIGNIFICANT CHANGE UP (ref 0.5–1)
BUN SERPL-MCNC: 52 MG/DL — HIGH (ref 7–23)
CALCIUM SERPL-MCNC: 8.8 MG/DL — SIGNIFICANT CHANGE UP (ref 8.5–10.1)
CHLORIDE SERPL-SCNC: 103 MMOL/L — SIGNIFICANT CHANGE UP (ref 96–108)
CO2 SERPL-SCNC: 30 MMOL/L — SIGNIFICANT CHANGE UP (ref 22–31)
CREAT SERPL-MCNC: 5.09 MG/DL — HIGH (ref 0.5–1.3)
EGFR: 8 ML/MIN/1.73M2 — LOW
GAMMA GLOBULIN: 0.7 G/DL — SIGNIFICANT CHANGE UP (ref 0.6–1.6)
GLUCOSE SERPL-MCNC: 83 MG/DL — SIGNIFICANT CHANGE UP (ref 70–99)
INTERPRETATION SERPL IFE-IMP: SIGNIFICANT CHANGE UP
POTASSIUM SERPL-MCNC: 3.4 MMOL/L — LOW (ref 3.5–5.3)
POTASSIUM SERPL-SCNC: 3.4 MMOL/L — LOW (ref 3.5–5.3)
PROT PATTERN SERPL ELPH-IMP: SIGNIFICANT CHANGE UP
SODIUM SERPL-SCNC: 140 MMOL/L — SIGNIFICANT CHANGE UP (ref 135–145)

## 2023-05-22 PROCEDURE — 99497 ADVNCD CARE PLAN 30 MIN: CPT

## 2023-05-22 PROCEDURE — 99233 SBSQ HOSP IP/OBS HIGH 50: CPT

## 2023-05-22 RX ADMIN — AMLODIPINE BESYLATE 5 MILLIGRAM(S): 2.5 TABLET ORAL at 05:32

## 2023-05-22 RX ADMIN — Medication 1 MILLIGRAM(S): at 13:17

## 2023-05-22 RX ADMIN — PANTOPRAZOLE SODIUM 40 MILLIGRAM(S): 20 TABLET, DELAYED RELEASE ORAL at 05:32

## 2023-05-22 RX ADMIN — CHLORHEXIDINE GLUCONATE 1 APPLICATION(S): 213 SOLUTION TOPICAL at 05:34

## 2023-05-22 RX ADMIN — Medication 667 MILLIGRAM(S): at 08:11

## 2023-05-22 RX ADMIN — SENNA PLUS 2 TABLET(S): 8.6 TABLET ORAL at 22:46

## 2023-05-22 RX ADMIN — HEPARIN SODIUM 5000 UNIT(S): 5000 INJECTION INTRAVENOUS; SUBCUTANEOUS at 13:19

## 2023-05-22 RX ADMIN — Medication 1300 MILLIGRAM(S): at 22:47

## 2023-05-22 RX ADMIN — Medication 325 MILLIGRAM(S): at 13:19

## 2023-05-22 RX ADMIN — Medication 1300 MILLIGRAM(S): at 05:32

## 2023-05-22 RX ADMIN — DULOXETINE HYDROCHLORIDE 30 MILLIGRAM(S): 30 CAPSULE, DELAYED RELEASE ORAL at 13:17

## 2023-05-22 RX ADMIN — HEPARIN SODIUM 5000 UNIT(S): 5000 INJECTION INTRAVENOUS; SUBCUTANEOUS at 22:46

## 2023-05-22 RX ADMIN — Medication 667 MILLIGRAM(S): at 13:17

## 2023-05-22 RX ADMIN — MUPIROCIN 1 APPLICATION(S): 20 OINTMENT TOPICAL at 18:36

## 2023-05-22 RX ADMIN — ATORVASTATIN CALCIUM 20 MILLIGRAM(S): 80 TABLET, FILM COATED ORAL at 22:45

## 2023-05-22 RX ADMIN — HEPARIN SODIUM 5000 UNIT(S): 5000 INJECTION INTRAVENOUS; SUBCUTANEOUS at 05:32

## 2023-05-22 RX ADMIN — MUPIROCIN 1 APPLICATION(S): 20 OINTMENT TOPICAL at 05:32

## 2023-05-22 RX ADMIN — Medication 1300 MILLIGRAM(S): at 13:18

## 2023-05-22 RX ADMIN — Medication 667 MILLIGRAM(S): at 18:31

## 2023-05-22 NOTE — PROGRESS NOTE ADULT - ASSESSMENT
CKD 5:  She was started on HD urgently and improved. Feels better, but she does not want to continue. She stated that she does NOT want long term HD. She has many family members with ESRD and is familiar with the benefits and complications.  When questioned further, she stated that when she goes home, she thinks she will be OK for about 3 months and may need HD again. She will want only Acute HD at that time. She does not think that she is ready for Hospice and wants to go home. I explained to her and her son that the time to recurrence of symptoms will likely be far shorter as she would have lost renal function. She is open to a discussion on Home Hospice even though she feels she is not ready. She also does not want [facility] with onsite HD as she wants to stay in her apartment.    PLAN:  - Discharge planning.  - Follow up with Dr Diaz.    Metabolic Acidosis:  Improved.    Anemia:  Likely of ESRD.  - Fe Profile.  - Will start EPO.

## 2023-05-22 NOTE — CHART NOTE - NSCHARTNOTEFT_GEN_A_CORE
Due to history of CKD anemia CVA, this patient has a severe mobility limitation and requires a standard wheelchair to assist in daily ADLs. The wheelchair needs the ability to recline >90 degrees for patient. Patient cannot ambulate safely with a cane or a walker. Patient has sufficient upper body strength to self propel said wheelchair and has no expressed an unwillingness to use the wheelchair. Patient has ample room in the home and a caregiver to assist with the wheelchair. Use of a manual wheelchair will significantly improve the patient's ability to participate in daily ADLs and the patient will use it on a regular basis.

## 2023-05-22 NOTE — PROGRESS NOTE ADULT - REASON FOR ADMISSION
bradycardia and anemia

## 2023-05-22 NOTE — PROGRESS NOTE ADULT - CONVERSATION DETAILS
Spoke with patient who wants HD catheter removed and wants to go home.  She said if she gets sick she will come back to the hospital.  Discussed with patient that she would need HD at some point, and discussed option of remaining home with the support of hospice.  She said she does not want hospice, but expressed concern that patient may develop symptoms of shortness of breath and would advise having the support of hospice so they can support her and keep her comfortable at home.  She said if she were to get sick she would go back on HD.  Discussed that HD is not a temporizing measure in this case. She was agreeable to having home visiting doctor referral.  Explained she could transition to hospice outpatient if her wishes changed.  Referral email for home visiting doctor placed to [facility].
Spoke with patient at bedside who was asking for catheter to be removed from her neck.  She is aware that is how she is getting HD and said she wants to go tomorrow for one last session of HD and wants to stop after that.  Patient verbalized that she would die without HD.  Educated on hospice and their philosophy of care.  Patient interested in returning home, and explained that hospice can happen at home.  Would recommend hospice in case she develops symptoms because they can help manage her symptoms and keep her home.  Also spoke about LST such as intubation and CPR and said given she does not want to continue HD, would not advise we put her on machines or try and resuscitate her as it would not change the outcome and prolong suffering.  She said she wouldn't that, but wants to discuss option of hospice and resuscitation with her son.
Spoke with patient at bedside who was lethargic but fully aware of what is going on.  She said her son, Edgardo would be her decision maker should she be unable to make decisions.  Asked about her wishes regarding interventions such as CPR and intubation.  She said her son would take care of her.  She did indicate that she would not want to be sustained on machines however.  Educated patient that she could make her wishes known in advance on MOLST form if there are certain interventions she would not want such as intubation and CPR.  Discussed team will follow up with patient after she starts HD to see how she is tolerating it to which she was in agreement.    Later, went back to patient's room to speak with patient and her son, Edgardo.  They have had [facility] conversations prior, but nothing recently.  Encouraged that they continue these conversations.

## 2023-05-22 NOTE — CHART NOTE - NSCHARTNOTEFT_GEN_A_CORE
Based on patients ongoing issues with deconditioning and generalized weakness secondary to CKD, anemia, history of CVA, patient will require a semi-electric hospital bed. This is necessary to achieve positioning of the body in ways not feasible with an ordinary bed. Patient requires frequent body changes and an immediate need for a change in body position. Bed pillows have been tried and ruled out. Patient requires a gel overlay and requires assistance to make changes in body positioning. Patient needs head of bed to lift at least 30 to 45 degrees to prevent aspiration. Patient needs a patient lift because she requires transfer between bed and chair. Without it, the patient would be confined to the bed.

## 2023-05-22 NOTE — PROGRESS NOTE ADULT - PROVIDER SPECIALTY LIST ADULT
Hospitalist
Critical Care
Hospitalist
Palliative Care
Critical Care
Internal Medicine
Nephrology
Critical Care
Critical Care
Nephrology
Palliative Care
Nephrology
Palliative Care

## 2023-05-22 NOTE — PROGRESS NOTE ADULT - SUBJECTIVE AND OBJECTIVE BOX
INTERVAL HPI/OVERNIGHT EVENTS: pt doing well, no acute events overnight. Would like to go home. Patient states she does not want anymore HD - to take her IV out of her neck.     MEDICATIONS  (STANDING):  amLODIPine   Tablet 5 milliGRAM(s) Oral daily  atorvastatin 20 milliGRAM(s) Oral at bedtime  calcium acetate 667 milliGRAM(s) Oral three times a day with meals  chlorhexidine 2% Cloths 1 Application(s) Topical <User Schedule>  DULoxetine 30 milliGRAM(s) Oral daily  epoetin juan m (PROCRIT) Injectable 30774 Unit(s) IV Push <User Schedule>  ferrous    sulfate 325 milliGRAM(s) Oral daily  folic acid 1 milliGRAM(s) Oral daily  heparin   Injectable 5000 Unit(s) SubCutaneous every 8 hours  mupirocin 2% Nasal 1 Application(s) Both Nostrils two times a day  pantoprazole    Tablet 40 milliGRAM(s) Oral before breakfast  polyethylene glycol 3350 17 Gram(s) Oral daily  senna 2 Tablet(s) Oral at bedtime  sodium bicarbonate 1300 milliGRAM(s) Oral three times a day    MEDICATIONS  (PRN):  acetaminophen     Tablet .. 650 milliGRAM(s) Oral every 6 hours PRN Mild Pain (1 - 3), Moderate Pain (4 - 6)  benzonatate 100 milliGRAM(s) Oral every 8 hours PRN Cough  oxyCODONE    IR 2.5 milliGRAM(s) Oral every 6 hours PRN Severe Pain (7 - 10)  sodium chloride 0.9% lock flush 10 milliLiter(s) IV Push every 1 hour PRN Pre/post blood products, medications, blood draw, and to maintain line patency      Allergies    No Known Allergies    Intolerances        REVIEW OF SYSTEMS:  CONSTITUTIONAL: No fever, weight loss, or fatigue  EYES: No eye pain, visual disturbances, or discharge  ENMT:  No difficulty hearing, tinnitus, vertigo; No sinus or throat pain  NECK: No pain or stiffness  BREASTS: No pain, masses, or nipple discharge  RESPIRATORY: No cough, wheezing, chills or hemoptysis; No shortness of breath  CARDIOVASCULAR: No chest pain, palpitations, dizziness, or leg swelling  GASTROINTESTINAL: No abdominal or epigastric pain. No nausea, vomiting, or hematemesis; No diarrhea or constipation. No melena or hematochezia.  GENITOURINARY: No dysuria, frequency, hematuria, or incontinence  NEUROLOGICAL: No headaches, memory loss, loss of strength, numbness, or tremors  SKIN: No itching, burning, rashes, or lesions   LYMPH NODES: No enlarged glands  ENDOCRINE: No heat or cold intolerance; No hair loss  MUSCULOSKELETAL: No joint pain or swelling; No muscle, back, or extremity pain  PSYCHIATRIC: No depression, anxiety, mood swings, or difficulty sleeping  HEME/LYMPH: No easy bruising, or bleeding gums  ALLERGY AND IMMUNOLOGIC: No hives or eczema    Vital Signs Last 24 Hrs  T(C): 36.9 (22 May 2023 11:00), Max: 36.9 (22 May 2023 11:00)  T(F): 98.4 (22 May 2023 11:00), Max: 98.4 (22 May 2023 11:00)  HR: 83 (22 May 2023 11:00) (83 - 89)  BP: 145/75 (22 May 2023 11:00) (145/75 - 152/81)  BP(mean): --  RR: 19 (22 May 2023 11:00) (18 - 19)  SpO2: 97% (22 May 2023 11:00) (97% - 97%)    Parameters below as of 22 May 2023 11:00  Patient On (Oxygen Delivery Method): room air          PHYSICAL EXAM:  GENERAL: NAD, well-groomed, well-developed  HEAD:  Atraumatic, Normocephalic  EYES: EOMI, PERRLA, conjunctiva and sclera clear  ENMT: No tonsillar erythema, exudates, or enlargement; Moist mucous membranes, Good dentition, No lesions  NECK: Supple, No JVD, Normal thyroid  NERVOUS SYSTEM:  Alert & Oriented X3, Good concentration; Motor Strength 5/5 B/L upper and lower extremities; DTRs 2+ intact and symmetric  CHEST/LUNG: Clear to percussion bilaterally; No rales, rhonchi, wheezing, or rubs  HEART: Regular rate and rhythm; No murmurs, rubs, or gallops  ABDOMEN: Soft, Nontender, Nondistended; Bowel sounds present  EXTREMITIES:  2+ Peripheral Pulses, No clubbing, cyanosis, or edema  LYMPH: No lymphadenopathy noted  SKIN: No rashes or lesions    LABS:                        8.3    8.53  )-----------( 224      ( 20 May 2023 10:00 )             26.7     05-20    141  |  106  |  59<H>  ----------------------------<  149<H>  3.3<L>   |  27  |  5.38<H>    Ca    8.4<L>      20 May 2023 10:00  Phos  2.8     05-20  Mg     2.2     05-20    TPro  6.1  /  Alb  2.5<L>  /  TBili  0.3  /  DBili  x   /  AST  21  /  ALT  16  /  AlkPhos  75  05-20        CAPILLARY BLOOD GLUCOSE          RADIOLOGY & ADDITIONAL TESTS:    Imaging Personally Reviewed:  [ ] YES  [ ] NO    Consultant(s) Notes Reviewed:  [ ] YES  [ ] NO    Care Discussed with Consultants/Other Providers [ ] YES  [ ] NO

## 2023-05-22 NOTE — CHART NOTE - NSCHARTNOTEFT_GEN_A_CORE
RIJ catheter removed from R neck. Patient was explained about procedure and tolerated procedure well. RIJ catheter removed with no purulence or drainage noted on catheter tip. No blood or pus noted at site. Pressure held at site for >10 minutes, no bleeding noted. Occlusive dressing placed at site.   Vital signs stable during and after procedure. SpO2 >95%, /90 mmHg, HR 80s

## 2023-05-22 NOTE — PROGRESS NOTE ADULT - SUBJECTIVE AND OBJECTIVE BOX
BREA ALEJANDRE  82y  Patient is a 82y old  Female who presents with a chief complaint of bradycardia and anemia (22 May 2023 14:03)    HPI:  Seen and examined. No new complaints at this time.  Catheter removed today.    HEALTH ISSUES - PROBLEM Dx:  Bilateral leg pain    Acute renal failure    Decreased appetite    Functional quadriplegia    Encounter for palliative care    Abdominal pain          MEDICATIONS  (STANDING):  amLODIPine   Tablet 5 milliGRAM(s) Oral daily  atorvastatin 20 milliGRAM(s) Oral at bedtime  calcium acetate 667 milliGRAM(s) Oral three times a day with meals  chlorhexidine 2% Cloths 1 Application(s) Topical <User Schedule>  DULoxetine 30 milliGRAM(s) Oral daily  epoetin juan m (PROCRIT) Injectable 61968 Unit(s) IV Push <User Schedule>  ferrous    sulfate 325 milliGRAM(s) Oral daily  folic acid 1 milliGRAM(s) Oral daily  heparin   Injectable 5000 Unit(s) SubCutaneous every 8 hours  mupirocin 2% Nasal 1 Application(s) Both Nostrils two times a day  pantoprazole    Tablet 40 milliGRAM(s) Oral before breakfast  polyethylene glycol 3350 17 Gram(s) Oral daily  senna 2 Tablet(s) Oral at bedtime  sodium bicarbonate 1300 milliGRAM(s) Oral three times a day    MEDICATIONS  (PRN):  acetaminophen     Tablet .. 650 milliGRAM(s) Oral every 6 hours PRN Mild Pain (1 - 3), Moderate Pain (4 - 6)  benzonatate 100 milliGRAM(s) Oral every 8 hours PRN Cough  oxyCODONE    IR 2.5 milliGRAM(s) Oral every 6 hours PRN Severe Pain (7 - 10)  sodium chloride 0.9% lock flush 10 milliLiter(s) IV Push every 1 hour PRN Pre/post blood products, medications, blood draw, and to maintain line patency    Vital Signs Last 24 Hrs  T(C): 36.9 (22 May 2023 11:00), Max: 36.9 (22 May 2023 11:00)  T(F): 98.4 (22 May 2023 11:00), Max: 98.4 (22 May 2023 11:00)  HR: 83 (22 May 2023 11:00) (83 - 89)  BP: 145/75 (22 May 2023 11:00) (145/75 - 152/81)  BP(mean): --  RR: 19 (22 May 2023 11:00) (19 - 19)  SpO2: 97% (22 May 2023 11:00) (97% - 97%)    Parameters below as of 22 May 2023 11:00  Patient On (Oxygen Delivery Method): room air      Daily     Daily Weight in k.1 (22 May 2023 05:09)    PHYSICAL EXAM:  Constitutional:   She appears comfortable and not distressed. Not diaphoretic.    Neck:  The thyroid is normal. Trachea is midline.     Breasts: Normal examination.    Respiratory: The lungs are clear to auscultation. No dullness and expansion is normal.    Cardiovascular: S1 and S2 are normal. No mummurs, rubs or gallops are present.    Gastrointestinal: The abdomen is soft. No tenderness is present. No masses are present. Bowel sounds are normal.    Genitourinary: The bladder is not distended. No CVA tenderness is present.    Extremities: No edema is noted. No deformities are present.    Neurological: Cognition is normal. Tone, power and sensation are normal. Gait is steady.    Skin: No leasions are seen  or palpated.    Lymph Nodes: No lymphadenopathy is present.    Psychiatric: Mood is appropriate. No hallucinations or flight of ideas are noted.              140  |  103  |  52<H>  ----------------------------<  83  3.4<L>   |  30  |  5.09<H>    Ca    8.8      22 May 2023 06:10  Mg     2.1

## 2023-05-22 NOTE — PROGRESS NOTE ADULT - ASSESSMENT
82y Female with history of CKD stage 4 (Cr 8.42), CVA presenting to the ED w/ anemia and abd pain. Found to be bradycardic, but HDS w/ junctional rhythm likely 2/2 medication, kidney disease, anemia. Anemia likely 2/2 renal/chronic disease. Admitted to ICU for HD monitoring.     CKD stage 5  -s/p few sessions of HD   -Temporary HD catheter placement- DR. Mata will remove today     Anemia  most likely due to ESRD  -s/p transfusion 2 units   -Start epo per renal  Continue to monitor     Abdominal pain  -pt was full of stool  after having 5 Bm today- feels much better, abdominal pain subsided      Hypoxic respiratory distress-resolved  pt sating on room air   CT AP w/ bilat small effusions. RVP neg. BNP elevated in setting or renal disease.  -Monitor respiratory status  -Tessalon perles for cough    Junctional escape rhythm in ED on arrival, improved spontaneously after prbc's. Likely 2/2 to home medications/renal disease.  -Continue home meds: amlodipine 5 mg daily, lipitor 20 mg daily.   -Holding home lopressor in setting of bradycardia with junctional escape rhythm  -No further interventions at this time per cardiology; AV block and sinus pause  consider repeat cards consult    Functional Quadriplegia due to lower ext weakness  Uses a wheelchair       -DVT ppx hep sq    GOALs of Care:  spoke  son. wants patient home, catheter out. asking for home hospice   -Family/Health Care Proxy: son  -Full Code    Plan: IJ HD catheter to be removed by Dr. Mata Monday

## 2023-05-22 NOTE — CHART NOTE - NSCHARTNOTESELECT_GEN_ALL_CORE
Hospital Medicine acceptance note
Transfer Note
Hospital Bed/Event Note
PT Screen
RIJ Removal/Event Note
Wheelchair/Event Note

## 2023-05-23 ENCOUNTER — TRANSCRIPTION ENCOUNTER (OUTPATIENT)
Age: 83
End: 2023-05-23

## 2023-05-23 VITALS
RESPIRATION RATE: 18 BRPM | DIASTOLIC BLOOD PRESSURE: 75 MMHG | TEMPERATURE: 98 F | OXYGEN SATURATION: 98 % | SYSTOLIC BLOOD PRESSURE: 150 MMHG | HEART RATE: 92 BPM

## 2023-05-23 PROCEDURE — 99238 HOSP IP/OBS DSCHRG MGMT 30/<: CPT

## 2023-05-23 RX ORDER — FOLIC ACID 0.8 MG
1 TABLET ORAL
Qty: 0 | Refills: 0 | DISCHARGE
Start: 2023-05-23

## 2023-05-23 RX ORDER — FERROUS SULFATE 325(65) MG
1 TABLET ORAL
Qty: 0 | Refills: 0 | DISCHARGE
Start: 2023-05-23

## 2023-05-23 RX ORDER — SODIUM BICARBONATE 1 MEQ/ML
2 SYRINGE (ML) INTRAVENOUS
Qty: 0 | Refills: 0 | DISCHARGE
Start: 2023-05-23

## 2023-05-23 RX ORDER — ATORVASTATIN CALCIUM 80 MG/1
1 TABLET, FILM COATED ORAL
Qty: 0 | Refills: 0 | DISCHARGE
Start: 2023-05-23

## 2023-05-23 RX ORDER — DULOXETINE HYDROCHLORIDE 30 MG/1
1 CAPSULE, DELAYED RELEASE ORAL
Qty: 0 | Refills: 0 | DISCHARGE
Start: 2023-05-23

## 2023-05-23 RX ORDER — SENNA PLUS 8.6 MG/1
2 TABLET ORAL
Qty: 0 | Refills: 0 | DISCHARGE
Start: 2023-05-23

## 2023-05-23 RX ORDER — AMLODIPINE BESYLATE 2.5 MG/1
1 TABLET ORAL
Qty: 0 | Refills: 0 | DISCHARGE
Start: 2023-05-23

## 2023-05-23 RX ORDER — PANTOPRAZOLE SODIUM 20 MG/1
1 TABLET, DELAYED RELEASE ORAL
Qty: 0 | Refills: 0 | DISCHARGE
Start: 2023-05-23

## 2023-05-23 RX ADMIN — AMLODIPINE BESYLATE 5 MILLIGRAM(S): 2.5 TABLET ORAL at 05:53

## 2023-05-23 RX ADMIN — Medication 667 MILLIGRAM(S): at 10:04

## 2023-05-23 RX ADMIN — HEPARIN SODIUM 5000 UNIT(S): 5000 INJECTION INTRAVENOUS; SUBCUTANEOUS at 05:50

## 2023-05-23 RX ADMIN — PANTOPRAZOLE SODIUM 40 MILLIGRAM(S): 20 TABLET, DELAYED RELEASE ORAL at 05:52

## 2023-05-23 RX ADMIN — Medication 1300 MILLIGRAM(S): at 05:50

## 2023-05-23 RX ADMIN — MUPIROCIN 1 APPLICATION(S): 20 OINTMENT TOPICAL at 05:52

## 2023-05-23 RX ADMIN — CHLORHEXIDINE GLUCONATE 1 APPLICATION(S): 213 SOLUTION TOPICAL at 06:04

## 2023-05-23 NOTE — DISCHARGE NOTE PROVIDER - NSDCMRMEDTOKEN_GEN_ALL_CORE_FT
amLODIPine 5 mg oral tablet: 1 tab(s) orally once a day  atorvastatin 20 mg oral tablet: 1 tab(s) orally once a day (at bedtime)  DULoxetine 30 mg oral delayed release capsule: 1 cap(s) orally once a day  ferrous sulfate 325 mg (65 mg elemental iron) oral tablet: 1 tab(s) orally once a day  folic acid 1 mg oral tablet: 1 tab(s) orally once a day  furosemide 40 mg oral tablet: 1 orally once a day  Linzess 145 mcg oral capsule: 1 cap(s) orally once a day  pantoprazole 40 mg oral delayed release tablet: 1 tab(s) orally once a day (before a meal)  senna leaf extract oral tablet: 2 tab(s) orally once a day (at bedtime)  sodium bicarbonate 650 mg oral tablet: 2 tab(s) orally 3 times a day  spironolactone 25 mg oral tablet: 1 tab(s) orally once a day   amLODIPine 5 mg oral tablet: 1 tab(s) orally once a day  atorvastatin 20 mg oral tablet: 1 tab(s) orally once a day (at bedtime)  DULoxetine 30 mg oral delayed release capsule: 1 cap(s) orally once a day  ferrous sulfate 325 mg (65 mg elemental iron) oral tablet: 1 tab(s) orally once a day  folic acid 1 mg oral tablet: 1 tab(s) orally once a day  Linzess 145 mcg oral capsule: 1 cap(s) orally once a day  pantoprazole 40 mg oral delayed release tablet: 1 tab(s) orally once a day (before a meal)  senna leaf extract oral tablet: 2 tab(s) orally once a day (at bedtime)  sodium bicarbonate 650 mg oral tablet: 2 tab(s) orally 3 times a day

## 2023-05-23 NOTE — DISCHARGE NOTE PROVIDER - DISCHARGE SERVICE FOR PATIENT
on the discharge service for the patient. I have reviewed and made amendments to the documentation where necessary.
Will check rapid strep, UA and UCx, and re-assess.

## 2023-05-23 NOTE — DISCHARGE NOTE NURSING/CASE MANAGEMENT/SOCIAL WORK - NURSING SECTION COMPLETE
Karan,     Your thumb swelling and pain was due to an infection (abscess). We drained it today. I recommend taking Augmentin, an antibiotic, twice per day.   Soak your thumb in warm water with epsom salt twice per day, about 10 minutes each time.   Try to squeeze out any liquid that builds up in the thumb.   See me Wednesday if the thumb is not feeling better.   Continue to use Indocin as needed for pain relief. If pain is severe you can take the Hydrocodone.   Keep taking a probiotic.   I encourage you to schedule a physical with me.   Call to schedule a colonoscopy.    Patient/Caregiver provided printed discharge information.

## 2023-05-23 NOTE — DISCHARGE NOTE PROVIDER - NSDCCPCAREPLAN_GEN_ALL_CORE_FT
PRINCIPAL DISCHARGE DIAGNOSIS  Diagnosis: Hypervolemia  Assessment and Plan of Treatment:       SECONDARY DISCHARGE DIAGNOSES  Diagnosis: Junctional bradycardia  Assessment and Plan of Treatment:     Diagnosis: Acute on chronic kidney failure  Assessment and Plan of Treatment:

## 2023-05-23 NOTE — DISCHARGE NOTE PROVIDER - HOSPITAL COURSE
82y Female with history of CKD stage 4 (Cr 8.42), CVA presenting to the ED w/ anemia and abd pain. Found to be bradycardic, but HDS w/ junctional rhythm likely 2/2 medication, kidney disease, anemia. Anemia likely 2/2 renal/chronic disease. Admitted to ICU for HD monitoring.     CKD stage 5  s/p few sessions of HD   Temporary HD catheter placement- removed 5/22 at patient request   For home hospice referral    Anemia  most likely due to ESRD  s/p transfusion 2 units     Junctional escape rhythm in ED on arrival, improved spontaneously after prbc's. Likely 2/2 to home medications/renal disease.  Continue home meds: amlodipine 5 mg daily, lipitor 20 mg daily.   Holding home lopressor in setting of bradycardia with junctional escape rhythm  No further interventions at this time per cardiology; AV block and sinus pause  consider repeat cards consult    Functional Quadriplegia due to lower ext weakness  Uses a wheelchair     DVT ppx hep sq    On 5/23/23 this case was reviewed with Dr. Castro, the patient is medically stable and optimized for discharge.

## 2023-05-23 NOTE — DISCHARGE NOTE NURSING/CASE MANAGEMENT/SOCIAL WORK - PATIENT PORTAL LINK FT
You can access the FollowMyHealth Patient Portal offered by [facility] by registering at the following website: [email or web address]. By joining [facility]’s FollowMyHealth portal, you will also be able to view your health information using other applications (apps) compatible with our system.

## 2023-05-30 DIAGNOSIS — T44.7X5A ADVERSE EFFECT OF BETA-ADRENORECEPTOR ANTAGONISTS, INITIAL ENCOUNTER: ICD-10-CM

## 2023-05-30 DIAGNOSIS — I69.851 HEMIPLEGIA AND HEMIPARESIS FOLLOWING OTHER CEREBROVASCULAR DISEASE AFFECTING RIGHT DOMINANT SIDE: ICD-10-CM

## 2023-05-30 DIAGNOSIS — R09.02 HYPOXEMIA: ICD-10-CM

## 2023-05-30 DIAGNOSIS — R06.03 ACUTE RESPIRATORY DISTRESS: ICD-10-CM

## 2023-05-30 DIAGNOSIS — Z85.3 PERSONAL HISTORY OF MALIGNANT NEOPLASM OF BREAST: ICD-10-CM

## 2023-05-30 DIAGNOSIS — E83.39 OTHER DISORDERS OF PHOSPHORUS METABOLISM: ICD-10-CM

## 2023-05-30 DIAGNOSIS — Z99.3 DEPENDENCE ON WHEELCHAIR: ICD-10-CM

## 2023-05-30 DIAGNOSIS — I12.0 HYPERTENSIVE CHRONIC KIDNEY DISEASE WITH STAGE 5 CHRONIC KIDNEY DISEASE OR END STAGE RENAL DISEASE: ICD-10-CM

## 2023-05-30 DIAGNOSIS — R00.1 BRADYCARDIA, UNSPECIFIED: ICD-10-CM

## 2023-05-30 DIAGNOSIS — N17.9 ACUTE KIDNEY FAILURE, UNSPECIFIED: ICD-10-CM

## 2023-05-30 DIAGNOSIS — D62 ACUTE POSTHEMORRHAGIC ANEMIA: ICD-10-CM

## 2023-05-30 DIAGNOSIS — I45.10 UNSPECIFIED RIGHT BUNDLE-BRANCH BLOCK: ICD-10-CM

## 2023-05-30 DIAGNOSIS — R53.2 FUNCTIONAL QUADRIPLEGIA: ICD-10-CM

## 2023-05-30 DIAGNOSIS — D63.1 ANEMIA IN CHRONIC KIDNEY DISEASE: ICD-10-CM

## 2023-05-30 DIAGNOSIS — E87.70 FLUID OVERLOAD, UNSPECIFIED: ICD-10-CM

## 2023-05-30 DIAGNOSIS — N18.6 END STAGE RENAL DISEASE: ICD-10-CM

## 2023-05-30 DIAGNOSIS — G57.93 UNSPECIFIED MONONEUROPATHY OF BILATERAL LOWER LIMBS: ICD-10-CM
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
## 2023-05-30 DIAGNOSIS — E87.20 ACIDOSIS, UNSPECIFIED: ICD-10-CM

## 2024-02-20 RX ORDER — SPIRONOLACTONE 25 MG/1
1 TABLET, FILM COATED ORAL
Refills: 0 | DISCHARGE

## 2024-02-20 RX ORDER — FERROUS SULFATE 325(65) MG
1 TABLET ORAL
Refills: 0 | DISCHARGE

## 2024-02-20 RX ORDER — METOPROLOL TARTRATE 50 MG
1 TABLET ORAL
Refills: 0 | DISCHARGE

## 2024-02-20 RX ORDER — AMLODIPINE BESYLATE 2.5 MG/1
1 TABLET ORAL
Refills: 0 | DISCHARGE

## 2024-02-20 RX ORDER — LINACLOTIDE 145 UG/1
1 CAPSULE, GELATIN COATED ORAL
Refills: 0 | DISCHARGE

## 2024-02-20 RX ORDER — FOLIC ACID 0.8 MG
1 TABLET ORAL
Refills: 0 | DISCHARGE

## 2024-02-20 RX ORDER — FUROSEMIDE 40 MG
1 TABLET ORAL
Refills: 0 | DISCHARGE

## 2024-02-20 RX ORDER — DULOXETINE HYDROCHLORIDE 30 MG/1
1 CAPSULE, DELAYED RELEASE ORAL
Refills: 0 | DISCHARGE

## 2024-02-20 RX ORDER — SODIUM BICARBONATE 1 MEQ/ML
2 SYRINGE (ML) INTRAVENOUS
Refills: 0 | DISCHARGE

## 2024-02-20 RX ORDER — ATORVASTATIN CALCIUM 80 MG/1
1 TABLET, FILM COATED ORAL
Refills: 0 | DISCHARGE

## 2024-10-31 NOTE — PROCEDURE NOTE - NSSITEPREP_SKIN_A_CORE
Adherence to aseptic technique: hand hygiene prior to donning barriers (gown, gloves), don cap and mask, sterile drape over patient no hematuria/no renal colic/no flank pain L/no flank pain R/no urine discoloration/no gas in urine/no bladder infections/no incontinence/no dysuria

## 2025-01-14 NOTE — PATIENT PROFILE ADULT - DO YOU FEEL UNSAFE AT HOME, WORK, OR SCHOOL?
Show Aperture Variable?: Yes Duration Of Freeze Thaw-Cycle (Seconds): 5-10 Spray Paint Technique: No Spray Paint Text: The liquid nitrogen was applied to the skin utilizing a spray paint frosting technique. Medical Necessity Information: It is in your best interest to select a reason for this procedure from the list below. All of these items fulfill various CMS LCD requirements except the new and changing color options. Number Of Freeze-Thaw Cycles: 2 freeze-thaw cycles Post-Care Instructions: I reviewed with the patient in detail post-care instructions. Patient is to wear sunprotection, and avoid picking at any of the treated lesions. Pt may apply Vaseline to crusted or scabbing areas. Consent: The patient's verbal consent was obtained including but not limited to risks of crusting, scabbing, blistering, scarring, darker or lighter pigmentary change, recurrence, incomplete removal and infection. Detail Level: Simple Medical Necessity Clause: This procedure was medically necessary because: irritated no